# Patient Record
Sex: FEMALE | Race: WHITE | Employment: OTHER | URBAN - METROPOLITAN AREA
[De-identification: names, ages, dates, MRNs, and addresses within clinical notes are randomized per-mention and may not be internally consistent; named-entity substitution may affect disease eponyms.]

---

## 2017-02-10 ENCOUNTER — HOSPITAL ENCOUNTER (OUTPATIENT)
Dept: LAB | Age: 57
Discharge: HOME | End: 2017-02-10
Payer: SELF-PAY

## 2017-02-10 PROCEDURE — 88142 CYTOPATH C/V THIN LAYER: CPT | Performed by: OBSTETRICS & GYNECOLOGY

## 2017-07-07 ENCOUNTER — HOSPITAL ENCOUNTER (OUTPATIENT)
Dept: LAB | Age: 57
Discharge: HOME | End: 2017-07-07
Payer: SELF-PAY

## 2017-07-07 ENCOUNTER — HOSPITAL ENCOUNTER (OUTPATIENT)
Dept: LAB | Age: 57
Discharge: HOME | End: 2017-07-07

## 2017-07-07 PROCEDURE — 88305 TISSUE EXAM BY PATHOLOGIST: CPT | Performed by: OBSTETRICS & GYNECOLOGY

## 2017-07-07 PROCEDURE — 88142 CYTOPATH C/V THIN LAYER: CPT | Performed by: OBSTETRICS & GYNECOLOGY

## 2017-10-27 ENCOUNTER — HOSPITAL ENCOUNTER (OUTPATIENT)
Dept: LAB | Age: 57
Discharge: HOME | End: 2017-10-27
Payer: SELF-PAY

## 2017-10-27 PROCEDURE — 88142 CYTOPATH C/V THIN LAYER: CPT | Performed by: OBSTETRICS & GYNECOLOGY

## 2018-01-31 ENCOUNTER — HOSPITAL ENCOUNTER (OUTPATIENT)
Dept: LAB | Age: 58
Discharge: HOME | End: 2018-01-31
Payer: SELF-PAY

## 2018-01-31 PROCEDURE — 88142 CYTOPATH C/V THIN LAYER: CPT | Performed by: OBSTETRICS & GYNECOLOGY

## 2018-05-04 ENCOUNTER — HOSPITAL ENCOUNTER (OUTPATIENT)
Dept: LAB | Age: 58
Discharge: HOME | End: 2018-05-04
Payer: SELF-PAY

## 2018-05-04 PROCEDURE — 88142 CYTOPATH C/V THIN LAYER: CPT | Performed by: OBSTETRICS & GYNECOLOGY

## 2018-09-19 ENCOUNTER — HOSPITAL ENCOUNTER (OUTPATIENT)
Dept: LAB | Age: 58
Discharge: HOME | End: 2018-09-19
Payer: SELF-PAY

## 2018-09-19 PROBLEM — E66.01 SEVERE OBESITY (BMI 35.0-39.9): Status: ACTIVE | Noted: 2018-09-19

## 2018-09-19 PROCEDURE — 88142 CYTOPATH C/V THIN LAYER: CPT

## 2019-03-21 ENCOUNTER — HOSPITAL ENCOUNTER (OUTPATIENT)
Dept: MAMMOGRAPHY | Age: 59
Discharge: HOME | End: 2019-03-21
Attending: INTERNAL MEDICINE
Payer: COMMERCIAL

## 2019-03-21 DIAGNOSIS — Z12.39 ENCOUNTER FOR BREAST CANCER SCREENING USING NON-MAMMOGRAM MODALITY: ICD-10-CM

## 2019-03-21 PROCEDURE — 77067 SCR MAMMO BI INCL CAD: CPT

## 2019-03-22 ENCOUNTER — HOSPITAL ENCOUNTER (OUTPATIENT)
Dept: LAB | Age: 59
Discharge: HOME | End: 2019-03-22
Payer: COMMERCIAL

## 2019-03-22 PROCEDURE — 88142 CYTOPATH C/V THIN LAYER: CPT

## 2019-08-08 ENCOUNTER — HOSPITAL ENCOUNTER (OUTPATIENT)
Dept: PHYSICAL THERAPY | Age: 59
Discharge: HOME | End: 2019-08-08
Payer: COMMERCIAL

## 2019-08-08 PROCEDURE — 97110 THERAPEUTIC EXERCISES: CPT

## 2019-08-08 PROCEDURE — 97161 PT EVAL LOW COMPLEX 20 MIN: CPT

## 2019-08-08 NOTE — THERAPY EVALUATION
Brian Enriquez  : 1960  Primary: Myranda Spence Essentials*  Secondary:  [facility]rapy Center at [facility]  [address], [address] [place] [address], [address]  [phone]   [phone]        OUTPATIENT PHYSICAL THERAPY:Initial Assessment and PM 2019    ICD-10: Treatment Diagnosis: Muscle weakness (generalized) (M62.81) and Other lack of coordination (R27.8)  Precautions/Allergies:   Latex   MD Orders: Evaluate and treat MEDICAL/REFERRING DIAGNOSIS:  Separation of muscle (nontraumatic), other site [M62.08]   DATE OF ONSET: post hernia surgery  REFERRING PHYSICIAN: Marita Salgado DO  RETURN PHYSICIAN APPOINTMENT: 2019     INITIAL ASSESSMENT:  Brian Enriquez is a 63y.o. year old female with diagnosis of rectus diastasis. Contributing factors include decreased pelvic floor strength, decreased awareness transverse abdominis musculature, decreased tissue extensibility in abdomen. Patient is unable localize PF musculature without cueing and unable to locate transversus abdominis muscle without cueing, exhibits impaired pressure control via breathing and presents with poor abdominal control during exercise. The patient has lack of efficient self-management techniques/HEP and knowledge deficits regarding condition. These are all likely contributors to current symptoms. Patient will likely benefit from a combination of skilled physical therapy services and regular self-management techniques to address these findings, allowing for optimal therapeutic benefit and return to prior level of function. Proposed plan of care and importance of patient compliance discussed in detail with patient. All questions answered to patient's satisfaction. PROBLEM LIST (Impacting functional limitations):  1. Decreased Strength  2. Decreased Transfer Abilities  3. Decreased Knowledge of Precautions  4.  Decreased [place] with Home Exercise Program INTERVENTIONS PLANNED:  1. Neuromuscular re-education  2. Biofeedback as needed  3. HEP  4. Bladder retraining  5. Bladder education  6. Electrical Stimulation  7. Home Exercise Program (HEP)  8. Manual Therapy  9. Neuromuscular Re-education/Strengthening  10. Therapeutic Activites  11. Therapeutic Exercise/Strengthening  12. Transfer Training   TREATMENT PLAN:  Effective Dates: 8/8/2019 TO 11/6/2019 (90 days). Frequency/Duration: 1 time a week for 90 Days  GOALS: (Goals have been discussed and agreed upon with patient.)  Discharge Goals: Time  Frame: 90 days  1. Patient will verbalize the effects of bladder irritants and avoid as able to reduce abnormal bladder urgency. 2. Patient will use the pelvic brace exercise with all increases in intra-abdominal pressure to reduce or prevent stress urinary incontinence episodes. 3. Patient will demonstrate improved pelvic floor muscle coordination for bladder control by ability to perform at least 10 quick contractions in 10 seconds. 4. Patient will utilize proper toileting position and breathing coordination to eliminate straining with bowel movements. 5. Patient will decrease rectus diastasis to 2 finger width to increase ability to participate in pool aquatics with no discomfort. Outcome Measure:   [place]addi Female Pelvic Floor Questionnaire   Bladder Section: 4  Bowel Section: 15  Prolapse Section: 0   Sexual Function Section: 1  Total: 20  Medical Necessity:   · Patient demonstrates excellent rehab potential due to higher previous functional level. Reason for Services/Other Comments:  · Patient continues to require modification of therapeutic interventions to increase complexity of exercises. Total Duration: Evaluation 22 minutes, treatment 23 minutes       Rehabilitation Potential For Stated Goals: Excellent  Regarding [address] therapy, I certify that the treatment plan above will be carried out by a therapist or under their direction.   Thank you for this referral,  Laura Zacarias, PT, DPT     Referring Physician Signature: Valdez Taylor, DO              Date                    PAIN/SUBJECTIVE:   Initial:   0/10 Post Session:  0/10     HISTORY:   History of Present Injury/Illness (Reason for Referral): Has graham tummy tuck x3 c-sections. Breast reduction. Gallbladder, ovarian cyst. Very active daily pool aerobics. Hernia surgery. hemorhoidectomy    Urinary: Frequency 5 x/day, 1-2 x/night. Positive for urgency. Pt does not use not use pads for protection; 0 pads per day (PPD). Denies stress urinary incontinence (SARA), frequency, incomplete emptying, urinary hesitancy, dysuria and hematuria. Fluid intake: 64+ oz water/day; bladder irritants include: none  Bowel: Frequency 3x/day. Positive for pushing/straining with BM Negative for pincomplete emptying and fecal incontinence. [place] stool type: soft but small. Use of stool softeners or laxatives? Magnesium every night  Sexual: Pt is sexually active. Male partners. Contraception/birth control: no. Negative for dyspareunia. Pelvic Organ Prolapse/Pelvic Pain: Location: n/a. Worse with . Relieved with n/a. Max pain 0/10. Min pain 0/10. Past Medical History/Comorbidities:   Ms. aKrol Henderson  has a past medical history of Allergic rhinitis, cause unspecified, Arthritis, Cancer ([address] Chronic pain, Depressive disorder, not elsewhere classified, Endometrial cancer ([address] Impaired fasting glucose, Morbid obesity ([address] Nausea & vomiting, Other malaise and fatigue, Postsurgical hypothyroidism, Tension headache, Throat pain, and Type II or unspecified type diabetes mellitus without mention of complication, not stated as uncontrolled.  She also has no past medical history of Aneurysm ([address] Arrhythmia, Asthma, Autoimmune disease ([address] CAD (coronary artery disease), Chronic kidney disease, Chronic obstructive pulmonary disease ([address] Coagulation disorder ([address] Difficult intubation, Endocarditis, Heart failure ([address] Hypertension, Liver disease, Malignant hyperthermia due to anesthesia, Pseudocholinesterase deficiency, PUD (peptic ulcer disease), Rheumatic fever, Seizures ([address] Sleep apnea, Stroke ([facility] or Thromboembolus ([facility] Ms. Amol Reyes  has a past surgical history that includes hx breast reduction; hx partial thyroidectomy; hx cholecystectomy; hx hernia repair; hx  section; hx gyn; hx dilation and curettage (2016); and hx gyn (10/2016). Social History/Living Environment:     lives with   Have you ever had any pelvic trauma (orthopedic in nature, fall, MVA, etc.)? no  Have you ever experienced any unwanted physical or sexual contact? no  Have you ever experienced any form of medical trauma (GYN, urological, GI, etc)? no    Prior Level of Function/Work/Activity:  Live with , not working, independent. Had a fall     Ambulatory/Rehab Services H2 Model Falls Risk Assessment    Risk Factors:       No Risk Factors Identified Ability to Rise from Chair:       (0)  Ability to rise in a single movement    Falls Prevention Plan:       No modifications necessary   Total: (5 or greater = High Risk): 0     [facility] [employer]. All Rights Reserved. [facility] Patent #[id number]. Federal Law prohibits the replication, distribution or use without written permission from [facility] [employer]     Current Medications:       Current Outpatient Medications:     betamethasone valerate (VALISONE) 0.1 % topical cream, Apply  to affected area two (2) times a day., Disp: 45 g, Rfl: 3    montelukast (SINGULAIR) 10 mg tablet, Take 1 Tab by mouth nightly. Indications: inflammation of the nose due to an allergy, Disp: 90 Tab, Rfl: 3    nystatin (MYCOSTATIN) topical cream, Apply  to affected area two (2) times a day. PUT RX ON FILE, Disp: 30 g, Rfl: 5    levothyroxine (SYNTHROID) 175 mcg tablet, Take 1 Tab by mouth Daily (before breakfast). , Disp: 90 Tab, Rfl: 3    glucosamine/methylsulfonylmeth (GLUCOSAMINE MSM PO), Take  by mouth., Disp: , Rfl:     turmeric/turmeric ext/pepr ext (TURMERIC-TURMERIC EXT-PEPPER PO), Take  by mouth., Disp: , Rfl:     metFORMIN (GLUCOPHAGE) 500 mg tablet, Take 1 Tab by mouth two (2) times daily (with meals). , Disp: 180 Tab, Rfl: 3    sertraline (ZOLOFT) 50 mg tablet, Take 1 Tab by mouth nightly. One po q day, Disp: 90 Tab, Rfl: 3    cetirizine (ZYRTEC) 10 mg tablet, Take 10 mg by mouth daily. Take / use AM day of surgery  per anesthesia protocols. Indications: ALLERGIC RHINITIS, Disp: , Rfl:     CALCIUM CARBONATE (CALCIUM 500 PO), Take 500 mg by mouth nightly. Takes 1000 mg every night, Disp: , Rfl:     CHOLECALCIFEROL, VITAMIN D3, (VITAMIN D3 PO), Take  by mouth nightly., Disp: , Rfl:     MAGNESIUM OXIDE/MAG AA CHELATE (MAGNESIUM, OXIDE/AA CHELATE, PO), Take  by mouth nightly., Disp: , Rfl:    Date Last Reviewed:  2019   Number of Personal Factors/Comorbidities that affect the Plan of Care: 1-2: MODERATE COMPLEXITY   EXAMINATION:   Strength Testing:    R knee extension: 4/5  L knee extension: 4/5  R hip extension: 4/5  L hip extension: 4/5  R dorsiflexion: 4/5  L dorsiflexion: 4/5    Diastisis Recti Trainin finger widths      PALPATION:  PFM contraction: Able to perform PFM contraction, required cues to decrease hip adductor and gluteus overflow. Abdominal MM: decreased extensibility and sensation inferior abdomen along  scar. RANGE OF MOTION:  WFL    STRENGTH: Plan to test further next session with use of biofeedback. P: Power, E: Endurance, R: Repetitions, QF: Quick Flicks, TrA: Transverse Abdominus  P    E    R    QF    TrA Required max verbal, manual, visual cues     COORDINATION:  Diaphragmatic breathing (DB): required max verbal, manual, visual cues     Body Structures Involved:  1. Muscles Body Functions Affected:  1. Genitourinary  2. Neuromusculoskeletal Activities and Participation Affected:  1.  Interpersonal Interactions and Relationships  2.  Community, Social and [place]   Number of elements (examined above) that affect the Plan of Care: 3: MODERATE COMPLEXITY   CLINICAL PRESENTATION:   Presentation: Stable and uncomplicated: LOW COMPLEXITY   CLINICAL DECISION MAKING:   Use of outcome tool(s) and clinical judgement create a POC that gives a: Clear prediction of patient's progress: LOW COMPLEXITY        Deneen Hardwick PT, DPT

## 2019-08-08 NOTE — PROGRESS NOTES
Chanda Oh  : 1960  Primary: Asael Lei Essentials*  Secondary:  [facility] [address] [facility], [place] [address] [address] [facility]  [phone]   [phone]        OUTPATIENT PHYSICAL THERAPY: Daily Treatment Note 2019  Visit Count:  1    In addition to the evaluation, the following treatment was rendered. MEDICAL/REFERRING DIAGNOSIS:  Separation of muscle (nontraumatic), other site [M62.08]   REFERRING PHYSICIAN: Octavio Chavez DO  Pre-treatment Symptoms/Complaints:  none  Pain: Initial:   0/10 Post Session:  0/10   Medications Last Reviewed:  19  Updated Objective Findings:  See evaluation note from today   TREATMENT:   THERAPEUTIC EXERCISE: (23 minutes minutes):  Exercises per grid below to improve mobility, strength, balance and coordination. Required moderate visual, verbal, manual and tactile cues to promote proper body alignment, promote proper body mechanics and promote proper body breathing techniques. Progressed complexity of movement as indicated. Date:  19 Date:   Date:     Activity/Exercise Parameters Parameters Parameters   Diaphragmatic breathing 10' throughout all exercise, required max tactile cues to breath into restricted areas     PFM contraction supine 3 x 10     TrA contraction supine hooklying 2 x 10     PFM contraction sitting x10     hooklying marching x1 (unable to perform without tenting despite PFM and TrA cues)     SLR x1 (unable to perform without tenting despite PFM and TrA cues)               (Used abbreviations: MET - muscle energy technique; SCS- Strain counter strain; CTM- Connective tissue mobilizations; CR- Contract/relax; SP- Sustained pressure, TrP- Trigger point release, IASTM- Instrument assisted soft tissue mobilizations, TDN- Trigger point dry needling).      [employer] Portal  The following educational topics were reviewed with patient:  Diastasis recti separation, scar tissue mobilization, bowel mechanics and posture for BM to decrease straining. Treatment/Session Summary:    · Response to Treatment:  Patient exhibited improved ability to perform TrA and PFM contraction with cues. · Communication/Consultation:  None today  · Equipment provided today:  None today  · Recommendations/Intent for next treatment session: Next visit will focus on biofeedback to TrA and PFM to improve coordination and strength as well as postural retraining to improve diastasis rectus separation.     Total Treatment Billable Duration:  23 minutes     Elizabeth Dailey, PT, DPT    Future Appointments   Date Time Provider Joss Calderon   8/8/2019  2:00 PM Rhea Newton PT, DPT [facility]   9/27/2019  1:30 PM Brenton Toussaint MD [facility]   12/16/2019  8:30 AM TRIM LAB RESOURCE TRIM TRIM   12/30/2019  1:45 PM Becca Matos DO TRIM TRIM

## 2019-08-20 ENCOUNTER — HOSPITAL ENCOUNTER (OUTPATIENT)
Dept: PHYSICAL THERAPY | Age: 59
Discharge: HOME | End: 2019-08-20
Payer: COMMERCIAL

## 2019-08-20 PROCEDURE — 97530 THERAPEUTIC ACTIVITIES: CPT

## 2019-08-20 PROCEDURE — 97140 MANUAL THERAPY 1/> REGIONS: CPT

## 2019-08-20 PROCEDURE — 97110 THERAPEUTIC EXERCISES: CPT

## 2019-08-20 NOTE — PROGRESS NOTES
Eleazar Ceballos  : 1960  Primary: Jorge Amestcher Essentials*  Secondary:  [facility] at [facility] [address] [facility], [place], [facility] [address] [facility]  [phone]   [phone]        OUTPATIENT PHYSICAL THERAPY: Daily Treatment Note 2019  Visit Count:  2    Patient verbalizes visual improvement in diastasis rectus this session. MEDICAL/REFERRING DIAGNOSIS:  Separation of muscle (nontraumatic), other site [M62.08]   REFERRING PHYSICIAN: Sarmad MANN DO  Pre-treatment Symptoms/Complaints:  none  Pain: Initial: Pain Intensity 1: 0 0/10 Post Session:  0/10   Medications Last Reviewed:  19  Updated Objective Findings:  None Today   TREATMENT:   THERAPEUTIC ACTIVITY: ( 15 minutes): Instruction on coordinated pelvic floor and diaphragmatic breathing to improve kinesthetic awareness of pelvic muscle mobility and restore proper motor recruitment patterns with breathing, posture, and functional movement (e.g. Decreased IAP when performing activities). Extensive instruction on coordinated exhalation/expanded abdomen and external anal sphincter relaxation using appropriate toilet positioning (i.e. Squatty Potty toileting stool) for increased anorectal angle to reduce need for straining and/or digital evacuation. THERAPEUTIC EXERCISE: (15 minutes):  Exercises per grid below to improve mobility, strength, balance and coordination. Required moderate visual, verbal, manual and tactile cues to promote proper body alignment, promote proper body posture, promote proper body mechanics and promote proper body breathing techniques. Progressed complexity of movement as indicated. Biofeedback utilizes to improve coordination in PFM and abdominal muscles.       Date:  19 Date:  19 Date:     Activity/Exercise Parameters Parameters Parameters   Diaphragmatic breathing 10' throughout all exercise, required max tactile cues to breath into restricted areas Throughout all exercises, required max verbal cues    PFM contraction supine 3 x 10 5 x 10    TrA contraction supine hooklying 2 x 10 3 x 10    PFM contraction sitting x10     hooklying marching x1 (unable to perform without tenting despite PFM and TrA cues) x10 B with PFM and TrA contraction    SLR x1 (unable to perform without tenting despite PFM and TrA cues)     hooklying heel slide  x10 B with PFM and TrA contraction      MANUAL THERAPY: (23 minutes): Soft tissue mobilization and scar tissue mobilization was utilized and necessary because of the patient's restricted motion of soft tissue. (Used abbreviations: MET - muscle energy technique; SCS- Strain counter strain; CTM- Connective tissue mobilizations; CR- Contract/relax; SP- Sustained pressure, TrP- Trigger point release, IASTM- Instrument assisted soft tissue mobilizations, TDN- Trigger point dry needling). [email or web address] Portal  The following educational topics were reviewed with patient:  Diastasis recti separation, scar tissue mobilization, bowel mechanics and posture for BM to decrease straining. Treatment/Session Summary:    · Response to Treatment:  Patient exhibited improved ability to perform TrA and PFM contraction with cues. · Communication/Consultation:  None today  · Equipment provided today:  None today  · Recommendations/Intent for next treatment session: Next visit will focus on biofeedback to TrA and PFM to improve coordination and strength as well as postural retraining to improve diastasis rectus separation.     Total Treatment Billable Duration:  53 minutes  PT Patient Time In/Time Out  Time In: 8734  Time Out: 1640  Sam Cuevas PT, DPT    Future Appointments   Date Time Provider Joss Calderon   9/3/2019  3:15 PM Kit Meadows PT, DPT [facility]   9/27/2019  1:30 PM Aiyana Parker MD [facility]   12/16/2019  8:30 AM TRIM LAB RESOURCE TRIM TRIM   12/30/2019  1:45 PM DO BLAYNE Reyes TRIM

## 2019-09-03 ENCOUNTER — HOSPITAL ENCOUNTER (OUTPATIENT)
Dept: PHYSICAL THERAPY | Age: 59
Discharge: HOME | End: 2019-09-03
Payer: COMMERCIAL

## 2019-09-03 PROCEDURE — 97110 THERAPEUTIC EXERCISES: CPT

## 2019-09-03 NOTE — PROGRESS NOTES
Denisse Khan  : 1960  Primary: Koko Johnson Essentials*  Secondary:  [facility]rapy Center at [facility]  [address], [address] [facility], [place] [address] [address] [facility]  [phone]   [phone]        OUTPATIENT PHYSICAL THERAPY: Daily Treatment Note 9/3/2019  Visit Count:  3    Patient verbalizes visual improvement in diastasis rectus this session. MEDICAL/REFERRING DIAGNOSIS:  Separation of muscle (nontraumatic), other site [M62.08]   REFERRING PHYSICIAN: Maria Antonia MANN DO  Pre-treatment Symptoms/Complaints:  none  Pain: Initial: Pain Intensity 1: 0 0/10 Post Session:  0/10   Medications Last Reviewed:  19  Updated Objective Findings:  None Today   TREATMENT:   THERAPEUTIC ACTIVITY: (  minutes): Instruction on coordinated pelvic floor and diaphragmatic breathing to improve kinesthetic awareness of pelvic muscle mobility and restore proper motor recruitment patterns with breathing, posture, and functional movement (e.g. Decreased IAP when performing activities). Extensive instruction on coordinated exhalation/expanded abdomen and external anal sphincter relaxation using appropriate toilet positioning (i.e. Squatty Potty toileting stool) for increased anorectal angle to reduce need for straining and/or digital evacuation. THERAPEUTIC EXERCISE: (55 minutes):  Exercises per grid below to improve mobility, strength, balance and coordination. Required moderate visual, verbal, manual and tactile cues to promote proper body alignment, promote proper body posture, promote proper body mechanics and promote proper body breathing techniques. Progressed complexity of movement as indicated. Biofeedback utilizes to improve coordination in PFM and abdominal muscles.       Date:  19 Date:  19 Date:  9/3/19   Activity/Exercise Parameters Parameters Parameters   Diaphragmatic breathing 10' throughout all exercise, required max tactile cues to breath into restricted areas Throughout all exercises, required max verbal cues Throughout all exercises, required moderate verbal cues to coordinate exhale with PFM and TrA contraction   PFM contraction supine 3 x 10 5 x 10 3 x 10   TrA contraction supine hooklying 2 x 10 3 x 10 3 x 10   PFM contraction sitting x10  3 x 10   hooklying marching x1 (unable to perform without tenting despite PFM and TrA cues) x10 B with PFM and TrA contraction x10 B with PFM and TrA contraction   SLR x1 (unable to perform without tenting despite PFM and TrA cues)     hooklying heel slide  x10 B with PFM and TrA contraction x10 B with PFM and TrA contraction   Table top with PFM and TrA contraction   x10   Bridging PFM and TrA contraction   x15 no band  x10 with green band   hooklying hip abduction    x15 with PFM and TrA contraction with green band                       MANUAL THERAPY: ( minutes): Soft tissue mobilization and scar tissue mobilization was utilized and necessary because of the patient's restricted motion of soft tissue. (Used abbreviations: MET - muscle energy technique; SCS- Strain counter strain; CTM- Connective tissue mobilizations; CR- Contract/relax; SP- Sustained pressure, TrP- Trigger point release, IASTM- Instrument assisted soft tissue mobilizations, TDN- Trigger point dry needling). [employer]  The following educational topics were reviewed with patient:  Diastasis recti separation, scar tissue mobilization, bowel mechanics and posture for BM to decrease straining. Treatment/Session Summary:    · Response to Treatment:  Patient exhibited improved ability to perform TrA and PFM contraction with cues. · Communication/Consultation:  None today  · Equipment provided today:  None today  · Recommendations/Intent for next treatment session: Next visit will focus on biofeedback to TrA and PFM to improve coordination and strength as well as postural retraining to improve diastasis rectus separation.     Total Treatment Billable Duration:  53 minutes  PT Patient Time In/Time Out  Time In: 1505  Time Out: [address], PT, DPT    Future Appointments   Date Time Provider Joss Calderon   9/17/2019  3:15 PM Lamar Barnes PT, DPT [facility]   9/27/2019  1:30 PM Loli Ladd MD [facility]   12/16/2019  8:30 AM TRIM LAB RESOURCE TRIM TRIM   12/30/2019  1:45 PM Dilia Houston DO TRIM TRIM

## 2019-09-17 ENCOUNTER — HOSPITAL ENCOUNTER (OUTPATIENT)
Dept: PHYSICAL THERAPY | Age: 59
Discharge: HOME | End: 2019-09-17
Payer: COMMERCIAL

## 2019-09-17 PROCEDURE — 97110 THERAPEUTIC EXERCISES: CPT

## 2019-09-17 NOTE — PROGRESS NOTES
Mariah Matos  : 1960  Primary: [employer]*  Secondary:  [facility] [facility], [place] [address] [address] [facility]  [phone]   [phone]        OUTPATIENT PHYSICAL THERAPY: Daily Treatment Note 2019  Visit Count:  4    Patient verbalizes visual improvement in diastasis rectus this session. MEDICAL/REFERRING DIAGNOSIS:  Separation of muscle (nontraumatic), other site [M62.08]   REFERRING PHYSICIAN: Nacho MANN DO  Pre-treatment Symptoms/Complaints:  none  Pain: Initial: Pain Intensity 1: 0 0/10 Post Session:  0/10   Medications Last Reviewed:  19  Updated Objective Findings:  None Today   TREATMENT:   THERAPEUTIC ACTIVITY: (  minutes): Instruction on coordinated pelvic floor and diaphragmatic breathing to improve kinesthetic awareness of pelvic muscle mobility and restore proper motor recruitment patterns with breathing, posture, and functional movement (e.g. Decreased IAP when performing activities). Extensive instruction on coordinated exhalation/expanded abdomen and external anal sphincter relaxation using appropriate toilet positioning (i.e. Squatty Potty toileting stool) for increased anorectal angle to reduce need for straining and/or digital evacuation. THERAPEUTIC EXERCISE: (55 minutes):  Exercises per grid below to improve mobility, strength, balance and coordination. Required moderate visual, verbal, manual and tactile cues to promote proper body alignment, promote proper body posture, promote proper body mechanics and promote proper body breathing techniques. Progressed complexity of movement as indicated. Biofeedback utilizes to improve coordination in PFM and abdominal muscles.       Date:  19 Date:  19 Date:  9/3/19 Date:  19    Activity/Exercise Parameters Parameters Parameters     Diaphragmatic breathing 10' throughout all exercise, required max tactile cues to breath into restricted areas Throughout all exercises, required max verbal cues Throughout all exercises, required moderate verbal cues to coordinate exhale with PFM and TrA contraction Throughout all exercises, required moderate verbal cues to coordinate exhale with PFM and TrA contraction    PFM contraction supine 3 x 10 5 x 10 3 x 10     TrA contraction supine hooklying 2 x 10 3 x 10 3 x 10     PFM contraction sitting x10  3 x 10     hooklying marching x1 (unable to perform without tenting despite PFM and TrA cues) x10 B with PFM and TrA contraction x10 B with PFM and TrA contraction     SLR x1 (unable to perform without tenting despite PFM and TrA cues)   x10 B with PRM and trA contraction    hooklying heel slide  x10 B with PFM and TrA contraction x10 B with PFM and TrA contraction x10 B with PRM and trA contraction    Table top with PFM and TrA contraction   x10     Bridging PFM and TrA contraction   x15 no band  x10 with green band     hooklying hip abduction    x15 with PFM and TrA contraction with green band     Dead bug    x10 B with PRM and trA contraction    Sit to stand    x10 B with PRM and trA contraction    Marching on hard surface    x10 B with PRM and trA contraction    Marching on stability ball    x10 B with PRM and trA contraction                              MANUAL THERAPY: ( minutes): Soft tissue mobilization and scar tissue mobilization was utilized and necessary because of the patient's restricted motion of soft tissue. (Used abbreviations: MET - muscle energy technique; SCS- Strain counter strain; CTM- Connective tissue mobilizations; CR- Contract/relax; SP- Sustained pressure, TrP- Trigger point release, IASTM- Instrument assisted soft tissue mobilizations, TDN- Trigger point dry needling).      [employer] [facility]  The following educational topics were reviewed with patient:  Diastasis recti separation, scar tissue mobilization, bowel mechanics and posture for BM to decrease straining. Treatment/Session Summary:    · Response to Treatment:  Patient exhibited improved ability to perform TrA and PFM contraction with cues. · Communication/Consultation:  None today  · Equipment provided today:  None today  · Recommendations/Intent for next treatment session: Next visit will focus on biofeedback to TrA and PFM to improve coordination and strength as well as postural retraining to improve diastasis rectus separation.     Total Treatment Billable Duration:  53 minutes  PT Patient Time In/Time Out  Time In: 7802  Time Out: 4456  Elis Roth PT, DPT    Future Appointments   Date Time Provider Joss Calderon   9/27/2019  1:30 PM Matthew Alexandre MD [facility]   12/16/2019  8:30 AM TRIM LAB RESOURCE TRIM TRIM   12/30/2019  1:45 PM DO BLAYNE Pearl TRIM

## 2019-09-27 ENCOUNTER — HOSPITAL ENCOUNTER (OUTPATIENT)
Dept: LAB | Age: 59
Discharge: HOME | End: 2019-09-27
Payer: COMMERCIAL

## 2019-09-27 PROCEDURE — 88142 CYTOPATH C/V THIN LAYER: CPT

## 2019-11-01 NOTE — THERAPY EVALUATION
Mono Magallanes : 1960 [place]: [place] [employer]* Secondary:  [facility] [address] [facility], [place] [address] [address] [facility] [phone]   [phone] OUTPATIENT PHYSICAL THERAPY:Progress Report 2019 ICD-10: Treatment Diagnosis: Muscle weakness (generalized) (M62.81) and Other lack of coordination (R27.8) Precautions/Allergies:  
Latex MD Orders: Evaluate and treat MEDICAL/REFERRING DIAGNOSIS: 
Separation of muscle (nontraumatic), other site [M62.08] DATE OF ONSET: post hernia surgery REFERRING PHYSICIAN: Jero Colon DO 
RETURN PHYSICIAN APPOINTMENT: 2019 Progress Report:  Mono Magallanes is a 63y.o. year old female with diagnosis of rectus diastasis. Contributing factors include decreased pelvic floor strength, decreased awareness transverse abdominis musculature, decreased tissue extensibility in abdomen. She has experienced visual improvement in diastasis rectus separation, improvement in urinary symptoms. She presents with improved intraabdominal and breathing control while performing all exercises, however continues to require cues for correct performance. Recommend continued physical therapy one time per week to progress therapeutic exercises, improve mechanics for lifting. Patient will likely benefit from a combination of skilled physical therapy services and regular self-management techniques to address these findings, allowing for optimal therapeutic benefit and return to prior level of function. Proposed plan of care and importance of patient compliance discussed in detail with patient. All questions answered to patient's satisfaction. PROBLEM LIST (Impacting functional limitations): 1. Decreased Strength 2. Decreased Transfer Abilities 3. Decreased Knowledge of Precautions 4.  Decreased [place] with Home Exercise Program INTERVENTIONS PLANNED: 
 1. Neuromuscular re-education 2. Biofeedback as needed 3. HEP 4. Bladder retraining 5. Bladder education 6. Electrical Stimulation 7. Home Exercise Program (HEP) 8. Manual Therapy 9. Neuromuscular Re-education/Strengthening 10. Therapeutic Activites 11. Therapeutic Exercise/Strengthening 12. Transfer Training TREATMENT PLAN: 
Effective Dates: 8/8/2019 TO 11/6/2019 (90 days). Frequency/Duration: 1 time a week for 90 Days GOALS: (Goals have been discussed and agreed upon with patient.) Discharge Goals: Time  Frame: 90 days 1. Patient will verbalize the effects of bladder irritants and avoid as able to reduce abnormal bladder urgency. 2. Patient will use the pelvic brace exercise with all increases in intra-abdominal pressure to reduce or prevent stress urinary incontinence episodes. 3. Patient will demonstrate improved pelvic floor muscle coordination for bladder control by ability to perform at least 10 quick contractions in 10 seconds. 4. Patient will utilize proper toileting position and breathing coordination to eliminate straining with bowel movements. 5. Patient will decrease rectus diastasis to 2 finger width to increase ability to participate in pool aquatics with no discomfort. Outcome Measure:  
Maddi Female Pelvic Floor Questionnaire Bladder Section: 4 Bowel Section: 15 Prolapse Section: 0 Sexual Function Section: 1 Total: 20 Medical Necessity:  
· Patient demonstrates excellent rehab potential due to higher previous functional level. Reason for Services/Other Comments: 
· Patient continues to require modification of therapeutic interventions to increase complexity of exercises. Total Duration: Evaluation 22 minutes, treatment 23 minutes PT Patient Time In/Time Out Time In: 4850 Time Out: 2396 Rehabilitation Potential For Stated Goals: Excellent Regarding Samantha Ibarra's therapy, I certify that the treatment plan above will be carried out by a therapist or under their direction. Thank you for this referral, Elizabeth Dailey, PT, DPT Referring Physician Signature: Becca Matos, DO              Date PAIN/SUBJECTIVE:  
Initial: Pain Intensity 1: 0 0/10 Post Session:  0/10 HISTORY:  
History of Present Injury/Illness (Reason for Referral): Has [facility] tummy tuck x3 c-sections. Breast reduction. Gallbladder, ovarian cyst. Very active daily pool aerobics. Hernia surgery. hemorhoidectomy Urinary: Frequency 5 x/day, 1-2 x/night. Positive for urgency. Pt does not use not use pads for protection; 0 pads per day (PPD). Denies stress urinary incontinence (SARA), frequency, incomplete emptying, urinary hesitancy, dysuria and hematuria. Fluid intake: 64+ oz water/day; bladder irritants include: none Bowel: Frequency 3x/day. Positive for pushing/straining with BM Negative for pincomplete emptying and fecal incontinence. [place] stool type: soft but small. Use of stool softeners or laxatives? Magnesium every night Sexual: Pt is sexually active. Male partners. Contraception/birth control: no. Negative for dyspareunia. Pelvic Organ Prolapse/Pelvic Pain: Location: n/a. Worse with . Relieved with n/a. Max pain 0/10. Min pain 0/10. Past Medical History/Comorbidities: Ms. Nikia Aj  has a past medical history of Allergic rhinitis, cause unspecified, Arthritis, Cancer ([address] Chronic pain, Depressive disorder, not elsewhere classified, Endometrial cancer ([address] Impaired fasting glucose, Morbid obesity ([address] Nausea & vomiting, Other malaise and fatigue, Postsurgical hypothyroidism, Tension headache, Throat pain, and Type II or unspecified type diabetes mellitus without mention of complication, not stated as uncontrolled.  She also has no past medical history of Aneurysm ([address] Arrhythmia, Asthma, Autoimmune disease ([address] CAD (coronary artery disease), Chronic kidney disease, Chronic obstructive pulmonary disease ([address] Coagulation disorder ([address] Difficult intubation, Endocarditis, Heart failure ([address] Hypertension, Liver disease, Malignant hyperthermia due to anesthesia, Pseudocholinesterase deficiency, PUD (peptic ulcer disease), Rheumatic fever, Seizures ([address] Sleep apnea, Stroke ([address] or Thromboembolus ([address] Ms. Bora Valdes  has a past surgical history that includes hx breast reduction; hx partial thyroidectomy; hx cholecystectomy; hx hernia repair; hx  section; hx gyn; hx dilation and curettage (2016); and hx gyn (10/2016). Social History/Living Environment:  
  lives with  Have you ever had any pelvic trauma (orthopedic in nature, fall, MVA, etc.)? no 
Have you ever experienced any unwanted physical or sexual contact? no 
Have you ever experienced any form of medical trauma (GYN, urological, GI, etc)? no 
 
Prior Level of Function/Work/Activity: 
Live with , not working, independent. Had a fall Ambulatory/Rehab Services H2 Model Falls Risk Assessment Risk Factors: 
     No Risk Factors Identified Ability to Rise from Chair: 
     (0)  Ability to rise in a single movement Falls Prevention Plan: No modifications necessary Total: (5 or greater = High Risk): 0  
  [facility] [address] [place] [address] Patent #[id number]. Federal Law prohibits the replication, distribution or use without written permission from [facility] [employer] Current Medications:   
  
Current Outpatient Medications:  
  betamethasone valerate (VALISONE) 0.1 % topical cream, Apply  to affected area two (2) times a day., Disp: 45 g, Rfl: 3 
  montelukast (SINGULAIR) 10 mg tablet, Take 1 Tab by mouth nightly. Indications: inflammation of the nose due to an allergy, Disp: 90 Tab, Rfl: 3 
  nystatin (MYCOSTATIN) topical cream, Apply  to affected area two (2) times a day. PUT RX ON FILE, Disp: 30 g, Rfl: 5   levothyroxine (SYNTHROID) 175 mcg tablet, Take 1 Tab by mouth Daily (before breakfast). , Disp: 90 Tab, Rfl: 3 
  glucosamine/methylsulfonylmeth (GLUCOSAMINE MSM PO), Take  by mouth., Disp: , Rfl:  
  turmeric/turmeric ext/pepr ext (TURMERIC-TURMERIC EXT-PEPPER PO), Take  by mouth., Disp: , Rfl:  
  metFORMIN (GLUCOPHAGE) 500 mg tablet, Take 1 Tab by mouth two (2) times daily (with meals). , Disp: 180 Tab, Rfl: 3 
  sertraline (ZOLOFT) 50 mg tablet, Take 1 Tab by mouth nightly. One po q day, Disp: 90 Tab, Rfl: 3 
  cetirizine (ZYRTEC) 10 mg tablet, Take 10 mg by mouth daily. Take / use AM day of surgery  per anesthesia protocols. Indications: ALLERGIC RHINITIS, Disp: , Rfl:  
  CALCIUM CARBONATE (CALCIUM 500 PO), Take 500 mg by mouth nightly. Takes 1000 mg every night, Disp: , Rfl:  
  CHOLECALCIFEROL, VITAMIN D3, (VITAMIN D3 PO), Take  by mouth nightly., Disp: , Rfl:  
  MAGNESIUM OXIDE/MAG AA CHELATE (MAGNESIUM, OXIDE/AA CHELATE, PO), Take  by mouth nightly., Disp: , Rfl:   
Date Last Reviewed:  2019 Number of Personal Factors/Comorbidities that affect the Plan of Care: 1-2: MODERATE COMPLEXITY EXAMINATION:  
Strength Testing: 
 
R knee extension: 4/5 L knee extension: 4/5 R hip extension: 4/5 L hip extension: 4/5 
R dorsiflexion: 4/5 L dorsiflexion: 4/5 Diastisis Recti Trainin finger widths PALPATION: 
PFM contraction: Able to perform PFM contraction, required cues to decrease hip adductor and gluteus overflow. Abdominal MM: decreased extensibility and sensation inferior abdomen along  scar. RANGE OF MOTION: 
WFL 
 
STRENGTH: Plan to test further next session with use of biofeedback. P: Power, E: Endurance, R: Repetitions, QF: Quick Flicks, TrA: Transverse Abdominus P   
E   
R   
QF TrA Required max verbal, manual, visual cues COORDINATION: 
Diaphragmatic breathing (DB): required max verbal, manual, visual cues Body Structures Involved: 1. Muscles Body Functions Affected: 1. Genitourinary 2. Neuromusculoskeletal Activities and Participation Affected: 
1. Interpersonal Interactions and Relationships 2. Community, Social and Left Hand [place] Number of elements (examined above) that affect the Plan of Care: 3: MODERATE COMPLEXITY CLINICAL PRESENTATION:  
Presentation: Stable and uncomplicated: LOW COMPLEXITY CLINICAL DECISION MAKING:  
Use of outcome tool(s) and clinical judgement create a POC that gives a: Clear prediction of patient's progress: LOW COMPLEXITY Purnima Ortega PT, DPT

## 2019-12-17 NOTE — THERAPY DISCHARGE
Eleazar Ceballos : 1960 [place] [employer]* Secondary:  [facility] [facility], [place] [address] [address] [facility] [phone]   [phone] OUTPATIENT PHYSICAL THERAPY:Discontinuation Summary 2019 ICD-10: Treatment Diagnosis: Muscle weakness (generalized) (M62.81) and Other lack of coordination (R27.8) Precautions/Allergies:  
Latex MD Orders: Evaluate and treat MEDICAL/REFERRING DIAGNOSIS: 
Separation of muscle (nontraumatic), other site [M62.08] DATE OF ONSET: post hernia surgery REFERRING PHYSICIAN: Monet Sosa DO 
RETURN PHYSICIAN APPOINTMENT: 2019 Eleazar Ceballos has been seen in physical therapy from 19 to 19 for 4 visits. Treatment has been discontinued at this time due to patient failing to return for additional treatment. The below goals were met prior to discontinuation. Some goals were not met due to patient not able to participate in therapy as expected. Thank you for this referral.    
  
PROBLEM LIST (Impacting functional limitations): 1. Decreased Strength 2. Decreased Transfer Abilities 3. Decreased Knowledge of Precautions 4. Decreased [place] with Home Exercise Program INTERVENTIONS PLANNED: 
1. Neuromuscular re-education 2. Biofeedback as needed 3. HEP 4. Bladder retraining 5. Bladder education 6. Electrical Stimulation 7. Home Exercise Program (HEP) 8. Manual Therapy 9. Neuromuscular Re-education/Strengthening 10. Therapeutic Activites 11. Therapeutic Exercise/Strengthening 12. Transfer Training TREATMENT PLAN: 
Effective Dates: 2019 TO 2019 (90 days). Frequency/Duration: 1 time a week for 90 Days GOALS: (Goals have been discussed and agreed upon with patient.) Not med secondary to therapy discontinuation. Discharge Goals: Time  Frame: 90 days 1.  Patient will verbalize the effects of bladder irritants and avoid as able to reduce abnormal bladder urgency. 2. Patient will use the pelvic brace exercise with all increases in intra-abdominal pressure to reduce or prevent stress urinary incontinence episodes. 3. Patient will demonstrate improved pelvic floor muscle coordination for bladder control by ability to perform at least 10 quick contractions in 10 seconds. 4. Patient will utilize proper toileting position and breathing coordination to eliminate straining with bowel movements. 5. Patient will decrease rectus diastasis to 2 finger width to increase ability to participate in pool aquatics with no discomfort. Outcome Measure:  
Maddi Female Pelvic Floor Questionnaire Bladder Section: 4 Bowel Section: 15 Prolapse Section: 0 Sexual Function Section: 1 Total: 20 Medical Necessity:  
· Patient demonstrates excellent rehab potential due to higher previous functional level. Reason for Services/Other Comments: 
· Patient continues to require modification of therapeutic interventions to increase complexity of exercises. Rehabilitation Potential For Stated Goals: Excellent Thank you for this referral, Neri Reyes, PT, DPT

## 2020-06-26 ENCOUNTER — HOSPITAL ENCOUNTER (OUTPATIENT)
Dept: LAB | Age: 60
Discharge: HOME | End: 2020-06-26
Payer: SELF-PAY

## 2020-06-26 PROCEDURE — 88142 CYTOPATH C/V THIN LAYER: CPT

## 2020-12-16 ENCOUNTER — HOSPITAL ENCOUNTER (OUTPATIENT)
Dept: LAB | Age: 60
Discharge: HOME | End: 2020-12-16

## 2020-12-16 PROCEDURE — 88142 CYTOPATH C/V THIN LAYER: CPT

## 2021-06-23 ENCOUNTER — HOSPITAL ENCOUNTER (OUTPATIENT)
Dept: LAB | Age: 61
Discharge: HOME | End: 2021-06-23

## 2021-06-23 DIAGNOSIS — C54.1 ENDOMETRIAL CANCER (HCC): ICD-10-CM

## 2021-06-23 PROCEDURE — 88142 CYTOPATH C/V THIN LAYER: CPT

## 2022-03-19 PROBLEM — E66.01 SEVERE OBESITY WITH BODY MASS INDEX (BMI) OF 35.0 TO 39.9 WITH SERIOUS COMORBIDITY (HCC): Status: ACTIVE | Noted: 2018-09-19

## 2022-06-01 ENCOUNTER — TELEPHONE (OUTPATIENT)
Dept: ONCOLOGY | Age: 62
End: 2022-06-01

## 2022-06-01 NOTE — TELEPHONE ENCOUNTER
Pt called requesting to speak to the provider. Stated she is concerned about some pain and cramps she is having in her lower stomach more to her left side. On Saturday of last week stated she had some bleeding when using the bathroom (bright red). Requested to speak to someone.

## 2022-06-02 ENCOUNTER — TELEMEDICINE (OUTPATIENT)
Dept: INTERNAL MEDICINE CLINIC | Facility: CLINIC | Age: 62
End: 2022-06-02

## 2022-06-02 DIAGNOSIS — R30.0 DYSURIA: Primary | ICD-10-CM

## 2022-06-02 DIAGNOSIS — R10.2 PELVIC PAIN: ICD-10-CM

## 2022-06-02 LAB
BACTERIA URINE, POC: ABNORMAL
BILIRUBIN, URINE, POC: NEGATIVE
BLOOD URINE, POC: ABNORMAL
CASTS URINE, POC: ABNORMAL
EPI CELLS URINE, POC: ABNORMAL
GLUCOSE URINE, POC: NEGATIVE
KETONES, URINE, POC: NEGATIVE
LEUKOCYTE ESTERASE, URINE, POC: ABNORMAL
NITRITE, URINE, POC: NEGATIVE
PH, URINE, POC: 6.5 (ref 4.6–8)
PROTEIN,URINE, POC: NEGATIVE
RBC, URINE, POC: ABNORMAL
SPECIFIC GRAVITY, URINE, POC: 1 (ref 1–1.03)
TRICHOMONAS URINE, POC: ABNORMAL
URINALYSIS CLARITY, POC: ABNORMAL
URINALYSIS COLOR, POC: YELLOW
UROBILINOGEN, POC: ABNORMAL
WBC, URINE, POC: ABNORMAL
YEAST, URINE, POC: ABNORMAL

## 2022-06-02 PROCEDURE — 99213 OFFICE O/P EST LOW 20 MIN: CPT | Performed by: INTERNAL MEDICINE

## 2022-06-02 PROCEDURE — 81000 URINALYSIS NONAUTO W/SCOPE: CPT | Performed by: INTERNAL MEDICINE

## 2022-06-02 NOTE — PROGRESS NOTES
Candice Saavedra was seen today for dysuria. Diagnoses and all orders for this visit:    Dysuria  Comments:  advise take doxycyline as she has a rx  Orders:  -     AMB POC URINALYSIS DIP STICK MANUAL W/ MICRO BSSC    Pelvic pain  Comments:  supect some sort of prolapse. cannot examine today due only able to do virtual visit. Says has appt with oncology soon. ask get pelvic there if possible          Colton Carroll is a 62 y.o. female    Chief Complaint   Patient presents with    Dysuria     pressure and cramping   2 weeks ago cramp ,had red blood groin area. Azo helped some  macrobid cleared it then after 5 days that cramping with bm and urinating  Painful lower abdomen.    miralax       Results for orders placed or performed in visit on 06/02/22   AMB POC URINALYSIS DIP STICK MANUAL W/ MICRO BSSC   Result Value Ref Range    Color (UA POC) Yellow     Clarity (UA POC) Cloudy     Glucose, Urine, POC Negative Negative    Bilirubin, Urine, POC Negative Negative    KETONES, Urine, POC Negative Negative    Specific Gravity, Urine, POC 1.005 1.001 - 1.035    Blood (UA POC) Trace Negative    pH, Urine, POC 6.5 4.6 - 8.0    Protein, Urine, POC Negative Negative    Urobilinogen, POC 0.2 mg/dL     Nitrite, Urine, POC Negative Negative    Leukocyte Esterase, Urine, POC Trace Negative    RBC, Urine, POC      WBC, Urine, POC      Epi Cells Urine, POC      Bacteria Urine, POC Trace Negative    casts urine, poc      Yeast, Urine, POC      Trichomonas Urine, POC         Past Medical History:   Diagnosis Date    Allergic rhinitis, cause unspecified     Arthritis     \"everywhere\"    Cancer ([facility]     thyroid- dx 1988    Chronic pain     knees    Depressive disorder, not elsewhere classified     Endometrial cancer ([facility] [address]     dx 10/3/16    Impaired fasting glucose     Morbid obesity (HCC)     BMI- 37 10/19/16    Nausea & vomiting     POV but \"The last surgery was the best\"- D&C 9/28/16    Other malaise and fatigue     Postsurgical hypothyroidism     Tension headache     Throat pain     Type II or unspecified type diabetes mellitus without mention of complication, not stated as uncontrolled     checks fastings almost daily-avg 108- today (10/19/16)= 90- 5 hrs pc; last A1C= 6.0 in Sept 2016        Family History   Problem Relation Age of Onset    No Known Problems Sister     No Known Problems Brother     No Known Problems Brother     No Known Problems Brother     No Known Problems Brother     No Known Problems Brother     No Known Problems Brother     No Known Problems Brother     Migraines Father     Heart Disease Father         MI    Cancer Brother         skin    Hypertension Mother     Stroke Mother         TIA    No Known Problems Sister     No Known Problems Sister         Social History     Socioeconomic History    Marital status:      Spouse name: Not on file    Number of children: Not on file    Years of education: Not on file    Highest education level: Not on file   Occupational History    Not on file   Tobacco Use    Smoking status: Never Smoker    Smokeless tobacco: Never Used   Substance and Sexual Activity    Alcohol use: No    Drug use: No    Sexual activity: Not on file   Other Topics Concern    Not on file   Social History Narrative    Not on file     Social Determinants of Health     Financial Resource Strain:     Difficulty of Paying Living Expenses: Not on file   Food Insecurity:     Worried About Running Out of Food in the Last Year: Not on file    Prasad of Food in the Last Year: Not on file   Transportation Needs:     Lack of Transportation (Medical): Not on file    Lack of Transportation (Non-Medical):  Not on file   Physical Activity:     Days of Exercise per Week: Not on file    Minutes of Exercise per Session: Not on file   Stress:     Feeling of Stress : Not on file   Social Connections:     Frequency of Communication with Friends and Family: Not on file    Frequency of Social Gatherings with Friends and Family: Not on file    Attends Sikh Services: Not on file    Active Member of Clubs or Organizations: Not on file    Attends Club or Organization Meetings: Not on file    Marital Status: Not on file   Intimate Partner Violence:     Fear of Current or Ex-Partner: Not on file    Emotionally Abused: Not on file    Physically Abused: Not on file    Sexually Abused: Not on file   Housing Stability:     Unable to Pay for Housing in the Last Year: Not on file    Number of [place] in the Last Year: Not on file    Unstable Housing in the Last Year: Not on file         Current Outpatient Medications:     albuterol sulfate  (90 Base) MCG/ACT inhaler, Inhale 1 puff into the lungs every 4 hours as needed, Disp: , Rfl:     albuterol (PROVENTIL) (2.5 MG/3ML) 0.083% nebulizer solution, Inhale 2.5 mg into the lungs every 6 hours as needed, Disp: , Rfl:     benzonatate (TESSALON) 100 MG capsule, 1-2 up to tid prn, Disp: , Rfl:     cetirizine (ZYRTEC) 10 MG tablet, Take 10 mg by mouth daily, Disp: , Rfl:     fluticasone (FLONASE) 50 MCG/ACT nasal spray, 2 sprays by Nasal route daily, Disp: , Rfl:     levothyroxine (SYNTHROID) 175 MCG tablet, Take 175 mcg by mouth every morning (before breakfast), Disp: , Rfl:     montelukast (SINGULAIR) 10 MG tablet, Take 10 mg by mouth, Disp: , Rfl:     nystatin (MYCOSTATIN) 207798 UNIT/GM cream, Apply topically 2 times daily, Disp: , Rfl:     sertraline (ZOLOFT) 50 MG tablet, Take 50 mg by mouth, Disp: , Rfl:     Allergies   Allergen Reactions    Latex Other (See Comments)     Rips skin off         Review of Systems        Vitals  - if done, taken by patient or caregiver at home. Physical Exam       Margie Burleson was seen today for dysuria.     Diagnoses and all orders for this visit:    Dysuria  Comments:  advise take doxycyline as she has a rx  Orders:  -     AMB POC URINALYSIS DIP STICK MANUAL W/ MICRO BSSC    Pelvic pain  Comments:  supect some sort of prolapse. cannot examine today due only able to do virtual visit. Says has appt with oncology soon. ask get pelvic there if possible          I was at home while conducting this encounter. Consent:  She and/or her healthcare decision maker is aware that this patient-initiated Telehealth encounter is a billable service, with coverage as determined by her insurance carrier. She is aware that she may receive a bill and has provided verbal consent to proceed: Yes    This virtual visit was conducted VV Type: Video and Audio    Total Time: minutes: 11-20 minutes. Annabelle Rees, was evaluated through a synchronous (real-time) audio-video encounter. The patient (or guardian if applicable) is aware that this is a billable service. Verbal consent to proceed has been obtained. The visit was conducted pursuant to the emergency declaration under the [facility], [address] [place] [address] [facility] authority and the [employer] Act. Patient identification was verified, and a caregiver was present when appropriate. The patient was located at home in a state where the provider was licensed to provide care.        oJsé Carolina DO

## 2022-06-02 NOTE — TELEPHONE ENCOUNTER
Discussed with Dr Asad Rodriguez. Patient was seen by PCP today and started treatment for possible UTI. She is to call us if she is still having abdominal pain once antibiotic completed and the UTI has resolved. If needed may order scan at that time. She has an appointment coming up later this month with Dr Asad Rodriguez and will keep that appointment.  She is in agreement with plan and knows to call with further concerns

## 2022-06-27 ENCOUNTER — HOSPITAL ENCOUNTER (EMERGENCY)
Age: 62
Discharge: HOME | End: 2022-06-27
Attending: EMERGENCY MEDICINE

## 2022-06-27 ENCOUNTER — TELEPHONE (OUTPATIENT)
Dept: INTERNAL MEDICINE CLINIC | Facility: CLINIC | Age: 62
End: 2022-06-27

## 2022-06-27 ENCOUNTER — HOSPITAL ENCOUNTER (EMERGENCY)
Dept: CT IMAGING | Age: 62
Discharge: HOME | End: 2022-06-30

## 2022-06-27 VITALS
HEIGHT: 67 IN | OXYGEN SATURATION: 98 % | HEART RATE: 79 BPM | SYSTOLIC BLOOD PRESSURE: 148 MMHG | RESPIRATION RATE: 17 BRPM | DIASTOLIC BLOOD PRESSURE: 75 MMHG | WEIGHT: 235 LBS | TEMPERATURE: 98.9 F | BODY MASS INDEX: 36.88 KG/M2

## 2022-06-27 DIAGNOSIS — K57.32 DIVERTICULITIS OF COLON: Primary | ICD-10-CM

## 2022-06-27 LAB
ALBUMIN SERPL-MCNC: 3.7 G/DL (ref 3.2–4.6)
ALBUMIN/GLOB SERPL: 1.1 {RATIO} (ref 1.2–3.5)
ALP SERPL-CCNC: 95 U/L (ref 50–130)
ALT SERPL-CCNC: 18 U/L (ref 12–65)
ANION GAP SERPL CALC-SCNC: 6 MMOL/L (ref 7–16)
APPEARANCE UR: CLEAR
AST SERPL-CCNC: 12 U/L (ref 15–37)
BACTERIA URNS QL MICRO: 0 /HPF
BASOPHILS # BLD: 0.1 K/UL (ref 0–0.2)
BASOPHILS NFR BLD: 1 % (ref 0–2)
BILIRUB SERPL-MCNC: 0.3 MG/DL (ref 0.2–1.1)
BILIRUB UR QL: NEGATIVE
BUN SERPL-MCNC: 11 MG/DL (ref 8–23)
CALCIUM SERPL-MCNC: 9.8 MG/DL (ref 8.3–10.4)
CASTS URNS QL MICRO: 0 /LPF
CHLORIDE SERPL-SCNC: 105 MMOL/L (ref 98–107)
CO2 SERPL-SCNC: 30 MMOL/L (ref 21–32)
COLOR UR: YELLOW
CREAT SERPL-MCNC: 0.73 MG/DL (ref 0.6–1)
DIFFERENTIAL METHOD BLD: ABNORMAL
EOSINOPHIL # BLD: 0.2 K/UL (ref 0–0.8)
EOSINOPHIL NFR BLD: 1 % (ref 0.5–7.8)
EPI CELLS #/AREA URNS HPF: 0 /HPF
ERYTHROCYTE [DISTWIDTH] IN BLOOD BY AUTOMATED COUNT: 14.1 % (ref 11.9–14.6)
GLOBULIN SER CALC-MCNC: 3.5 G/DL (ref 2.3–3.5)
GLUCOSE SERPL-MCNC: 117 MG/DL (ref 65–100)
GLUCOSE UR STRIP.AUTO-MCNC: NEGATIVE MG/DL
HCT VFR BLD AUTO: 43.7 % (ref 35.8–46.3)
HGB BLD-MCNC: 14.1 G/DL (ref 11.7–15.4)
HGB UR QL STRIP: ABNORMAL
IMM GRANULOCYTES # BLD AUTO: 0.1 K/UL (ref 0–0.5)
IMM GRANULOCYTES NFR BLD AUTO: 1 % (ref 0–5)
KETONES UR QL STRIP.AUTO: NEGATIVE MG/DL
LEUKOCYTE ESTERASE UR QL STRIP.AUTO: NEGATIVE
LIPASE SERPL-CCNC: 83 U/L (ref 73–393)
LYMPHOCYTES # BLD: 6.5 K/UL (ref 0.5–4.6)
LYMPHOCYTES NFR BLD: 37 % (ref 13–44)
MCH RBC QN AUTO: 26.9 PG (ref 26.1–32.9)
MCHC RBC AUTO-ENTMCNC: 32.3 G/DL (ref 31.4–35)
MCV RBC AUTO: 83.4 FL (ref 79.6–97.8)
MONOCYTES # BLD: 0.6 K/UL (ref 0.1–1.3)
MONOCYTES NFR BLD: 4 % (ref 4–12)
NEUTS SEG # BLD: 10 K/UL (ref 1.7–8.2)
NEUTS SEG NFR BLD: 57 % (ref 43–78)
NITRITE UR QL STRIP.AUTO: NEGATIVE
NRBC # BLD: 0 K/UL (ref 0–0.2)
PH UR STRIP: 7 [PH] (ref 5–9)
PLATELET # BLD AUTO: 237 K/UL (ref 150–450)
PMV BLD AUTO: 10.6 FL (ref 9.4–12.3)
POTASSIUM SERPL-SCNC: 3.7 MMOL/L (ref 3.5–5.1)
PROT SERPL-MCNC: 7.2 G/DL (ref 6.3–8.2)
PROT UR STRIP-MCNC: NEGATIVE MG/DL
RBC # BLD AUTO: 5.24 M/UL (ref 4.05–5.2)
RBC #/AREA URNS HPF: ABNORMAL /HPF
SODIUM SERPL-SCNC: 141 MMOL/L (ref 136–145)
SP GR UR REFRACTOMETRY: 1.01 (ref 1–1.02)
UROBILINOGEN UR QL STRIP.AUTO: 0.2 EU/DL (ref 0.2–1)
WBC # BLD AUTO: 17.5 K/UL (ref 4.3–11.1)
WBC URNS QL MICRO: 0 /HPF

## 2022-06-27 PROCEDURE — 80053 COMPREHEN METABOLIC PANEL: CPT

## 2022-06-27 PROCEDURE — 96375 TX/PRO/DX INJ NEW DRUG ADDON: CPT

## 2022-06-27 PROCEDURE — 96374 THER/PROPH/DIAG INJ IV PUSH: CPT

## 2022-06-27 PROCEDURE — 85025 COMPLETE CBC W/AUTO DIFF WBC: CPT

## 2022-06-27 PROCEDURE — 74177 CT ABD & PELVIS W/CONTRAST: CPT

## 2022-06-27 PROCEDURE — 99284 EMERGENCY DEPT VISIT MOD MDM: CPT

## 2022-06-27 PROCEDURE — 2580000003 HC RX 258: Performed by: EMERGENCY MEDICINE

## 2022-06-27 PROCEDURE — 6370000000 HC RX 637 (ALT 250 FOR IP): Performed by: EMERGENCY MEDICINE

## 2022-06-27 PROCEDURE — 81001 URINALYSIS AUTO W/SCOPE: CPT

## 2022-06-27 PROCEDURE — 6360000004 HC RX CONTRAST MEDICATION: Performed by: EMERGENCY MEDICINE

## 2022-06-27 PROCEDURE — 6360000002 HC RX W HCPCS: Performed by: EMERGENCY MEDICINE

## 2022-06-27 PROCEDURE — 83690 ASSAY OF LIPASE: CPT

## 2022-06-27 RX ORDER — 0.9 % SODIUM CHLORIDE 0.9 %
100 INTRAVENOUS SOLUTION INTRAVENOUS ONCE
Status: COMPLETED | OUTPATIENT
Start: 2022-06-27 | End: 2022-06-27

## 2022-06-27 RX ORDER — CIPROFLOXACIN 500 MG/1
500 TABLET, FILM COATED ORAL 2 TIMES DAILY
Qty: 28 TABLET | Refills: 0 | Status: SHIPPED | OUTPATIENT
Start: 2022-06-27 | End: 2022-07-11

## 2022-06-27 RX ORDER — CIPROFLOXACIN 500 MG/1
500 TABLET, FILM COATED ORAL ONCE
Status: COMPLETED | OUTPATIENT
Start: 2022-06-27 | End: 2022-06-27

## 2022-06-27 RX ORDER — MORPHINE SULFATE 4 MG/ML
4 INJECTION, SOLUTION INTRAMUSCULAR; INTRAVENOUS
Status: COMPLETED | OUTPATIENT
Start: 2022-06-27 | End: 2022-06-27

## 2022-06-27 RX ORDER — METRONIDAZOLE 500 MG/1
500 TABLET ORAL
Status: COMPLETED | OUTPATIENT
Start: 2022-06-27 | End: 2022-06-27

## 2022-06-27 RX ORDER — ONDANSETRON 2 MG/ML
4 INJECTION INTRAMUSCULAR; INTRAVENOUS ONCE
Status: COMPLETED | OUTPATIENT
Start: 2022-06-27 | End: 2022-06-27

## 2022-06-27 RX ORDER — METRONIDAZOLE 500 MG/1
500 TABLET ORAL 3 TIMES DAILY
Qty: 42 TABLET | Refills: 0 | Status: SHIPPED | OUTPATIENT
Start: 2022-06-27 | End: 2022-07-11

## 2022-06-27 RX ORDER — TRAMADOL HYDROCHLORIDE 50 MG/1
50 TABLET ORAL EVERY 6 HOURS PRN
Qty: 12 TABLET | Refills: 0 | Status: SHIPPED | OUTPATIENT
Start: 2022-06-27 | End: 2022-06-30

## 2022-06-27 RX ORDER — SODIUM CHLORIDE 0.9 % (FLUSH) 0.9 %
3 SYRINGE (ML) INJECTION EVERY 8 HOURS
Status: DISCONTINUED | OUTPATIENT
Start: 2022-06-27 | End: 2022-06-27 | Stop reason: HOSPADM

## 2022-06-27 RX ORDER — SODIUM CHLORIDE 0.9 % (FLUSH) 0.9 %
10 SYRINGE (ML) INJECTION 2 TIMES DAILY
Status: DISCONTINUED | OUTPATIENT
Start: 2022-06-27 | End: 2022-07-01 | Stop reason: HOSPADM

## 2022-06-27 RX ADMIN — ONDANSETRON 4 MG: 2 INJECTION INTRAMUSCULAR; INTRAVENOUS at 18:39

## 2022-06-27 RX ADMIN — IOPAMIDOL 100 ML: 755 INJECTION, SOLUTION INTRAVENOUS at 19:32

## 2022-06-27 RX ADMIN — SODIUM CHLORIDE 100 ML: 9 INJECTION, SOLUTION INTRAVENOUS at 19:33

## 2022-06-27 RX ADMIN — MORPHINE SULFATE 4 MG: 4 INJECTION INTRAVENOUS at 18:39

## 2022-06-27 RX ADMIN — METRONIDAZOLE 500 MG: 500 TABLET ORAL at 20:29

## 2022-06-27 RX ADMIN — CIPROFLOXACIN 500 MG: 500 TABLET, FILM COATED ORAL at 20:29

## 2022-06-27 ASSESSMENT — PAIN DESCRIPTION - LOCATION
LOCATION: ABDOMEN

## 2022-06-27 ASSESSMENT — PAIN - FUNCTIONAL ASSESSMENT
PAIN_FUNCTIONAL_ASSESSMENT: 0-10
PAIN_FUNCTIONAL_ASSESSMENT: ACTIVITIES ARE NOT PREVENTED
PAIN_FUNCTIONAL_ASSESSMENT: 0-10

## 2022-06-27 ASSESSMENT — PAIN SCALES - GENERAL
PAINLEVEL_OUTOF10: 7
PAINLEVEL_OUTOF10: 9
PAINLEVEL_OUTOF10: 7

## 2022-06-27 ASSESSMENT — PAIN DESCRIPTION - ORIENTATION: ORIENTATION: LEFT

## 2022-06-27 ASSESSMENT — ENCOUNTER SYMPTOMS
BELCHING: 0
ABDOMINAL PAIN: 1

## 2022-06-27 ASSESSMENT — PAIN DESCRIPTION - FREQUENCY: FREQUENCY: CONTINUOUS

## 2022-06-27 ASSESSMENT — PAIN DESCRIPTION - DESCRIPTORS: DESCRIPTORS: CRAMPING

## 2022-06-27 ASSESSMENT — PAIN DESCRIPTION - PAIN TYPE: TYPE: ACUTE PAIN

## 2022-06-27 NOTE — TELEPHONE ENCOUNTER
The patient called asking for a surgical referral. States she has a strangulated hernia. C/O pain and also had chills this morning. I talked with DR Odalis Darby he said she needs to go to the ER. Patient advised.

## 2022-06-27 NOTE — ED PROVIDER NOTES
[facility] Emergency Department Provider Note                   PCP:                Margareth Coon DO               Age: 62 y.o. Sex: female     No diagnosis found. DISPOSITION         New Prescriptions    No medications on file       Orders Placed This Encounter   Procedures    CT ABDOMEN PELVIS W IV CONTRAST Additional Contrast? Oral    CBC with Diff    CMP    Lipase    Urinalysis    Diet NPO    POCT Urine Dipstick    Saline lock IV        Moiz Vallejo MD 6:16 PM      MDM  Number of Diagnoses or Management Options  Diagnosis management comments: Patient has sigmoid diverticulitis with no complication. We will DC home and outpatient follow-up with her PCP ASAP. Amount and/or Complexity of Data Reviewed  Clinical lab tests: ordered and reviewed  Tests in the radiology section of CPT®: ordered and reviewed  Review and summarize past medical records: yes  Independent visualization of images, tracings, or specimens: yes    Risk of Complications, Morbidity, and/or Mortality  Presenting problems: moderate  Management options: moderate    Patient Progress  Patient progress: stable       Elo Gutierrez is a 62 y.o. female who presents to the Emergency Department with chief complaint of    Chief Complaint   Patient presents with    Abdominal Pain      Patient is a 62-year-old female with a history of  25 years ago with a abdominal wall hernia 20 years ago status post repair in 6 months after that she required a abdominal wall mesh who now presents with left lower abdominal pain constant in nature since 10:30 AM today. Patient states the pain is a 8 out of 10 worse with movement and touch. Patient states it occurred while she was doing water aerobics.       Abdominal Pain  Pain location:  LLQ  Pain quality: aching and dull    Pain radiates to:  Does not radiate  Pain severity:  Moderate  Onset quality:  Gradual  Duration:  8 hours  Timing:  Constant  Progression:  Waxing and waning  Chronicity:  New  Context: previous surgery    Context: not recent illness, not recent travel and not sick contacts    Relieved by:  Nothing  Worsened by: Movement  Ineffective treatments:  None tried  Associated symptoms: no anorexia and no belching        All other systems reviewed and are negative. Review of Systems   Gastrointestinal: Positive for abdominal pain. Negative for anorexia. All other systems reviewed and are negative.       Past Medical History:   Diagnosis Date    Allergic rhinitis, cause unspecified     Arthritis     \"everywhere\"    Cancer ([facility] [address]     thyroid- dx     Chronic pain     knees    Depressive disorder, not elsewhere classified     Endometrial cancer ([facility] [address]     dx 10/3/16    Impaired fasting glucose     Morbid obesity (HCC)     BMI- 37 10/19/16    Nausea & vomiting     POV but \"The last surgery was the best\"- D&C 16    Other malaise and fatigue     Postsurgical hypothyroidism     Tension headache     Throat pain     Type II or unspecified type diabetes mellitus without mention of complication, not stated as uncontrolled     checks fastings almost daily-avg 108- today (10/19/16)= 90- 5 hrs pc; last A1C= 6.0 in 2016         Past Surgical History:   Procedure Laterality Date    BREAST REDUCTION SURGERY       SECTION       x 3    CHOLECYSTECTOMY      DILATION AND CURETTAGE OF UTERUS  2016    GYN      ovarian cyst removed    GYN  10/2016    hyst/BSO    HERNIA REPAIR      #2 - mesh    THYROIDECTOMY, PARTIAL          Family History   Problem Relation Age of Onset    No Known Problems Sister     No Known Problems Brother     No Known Problems Brother     No Known Problems Brother     No Known Problems Brother     No Known Problems Brother     No Known Problems Brother     No Known Problems Brother     Migraines Father     Heart Disease Father         MI    Cancer Brother         skin    Hypertension Mother     Stroke Mother TIA    No Known Problems Sister     No Known Problems Sister            Social Connections:     Frequency of Communication with Friends and Family: Not on file    Frequency of Social Gatherings with Friends and Family: Not on file    Attends Evangelical Services: Not on file    Active Member of Clubs or Organizations: Not on file    Attends Club or Organization Meetings: Not on file    Marital Status: Not on file        Allergies   Allergen Reactions    Latex Other (See Comments)     Rips skin off        Vitals signs and nursing note reviewed. Patient Vitals for the past 4 hrs:   Temp Pulse Resp BP SpO2   06/27/22 1803 -- 79 17 (!) 148/75 98 %   06/27/22 1648 98.9 °F (37.2 °C) 81 16 (!) 150/77 97 %          Physical Exam  Vitals and nursing note reviewed. Constitutional:       Appearance: Normal appearance. HENT:      Head: Normocephalic and atraumatic. Nose: Nose normal.      Mouth/Throat:      Mouth: Mucous membranes are dry. Pharynx: Oropharynx is clear. Eyes:      Extraocular Movements: Extraocular movements intact. Conjunctiva/sclera: Conjunctivae normal.      Pupils: Pupils are equal, round, and reactive to light. Cardiovascular:      Rate and Rhythm: Normal rate. Pulses: Normal pulses. Pulmonary:      Effort: Pulmonary effort is normal.   Abdominal:      General: Abdomen is flat. Bowel sounds are normal.      Palpations: Abdomen is soft. Musculoskeletal:         General: Normal range of motion. Cervical back: Normal range of motion and neck supple. Skin:     General: Skin is warm and dry. Capillary Refill: Capillary refill takes less than 2 seconds. Neurological:      General: No focal deficit present. Mental Status: She is alert and oriented to person, place, and time. Mental status is at baseline. Psychiatric:         Mood and Affect: Mood normal.         Behavior: Behavior normal.         Thought Content:  Thought content normal. Judgment: Judgment normal.          Procedures        Labs Reviewed   CBC WITH AUTO DIFFERENTIAL - Abnormal; Notable for the following components:       Result Value    WBC 17.5 (*)     RBC 5.24 (*)     Segs Absolute 10.0 (*)     Absolute Lymph # 6.5 (*)     All other components within normal limits   COMPREHENSIVE METABOLIC PANEL - Abnormal; Notable for the following components:    Anion Gap 6 (*)     Glucose 117 (*)     AST 12 (*)     Albumin/Globulin Ratio 1.1 (*)     All other components within normal limits   URINALYSIS - Abnormal; Notable for the following components:    Blood, Urine TRACE (*)     All other components within normal limits   LIPASE        CT ABDOMEN PELVIS W IV CONTRAST Additional Contrast? Oral    (Results Pending)                            Voice dictation software was used during the making of this note. This software is not perfect and grammatical and other typographical errors may be present. This note has not been completely proofread for errors.         Jeffrey Alejandro MD  06/27/22 3951

## 2022-06-28 DIAGNOSIS — C54.1 ENDOMETRIAL CANCER (HCC): Primary | ICD-10-CM

## 2022-06-28 NOTE — ED NOTES
I have reviewed discharge instructions with the patient. The patient verbalized understanding. Patient left ED via Discharge Method: ambulatory to Home with self. Opportunity for questions and clarification provided. Patient given 3 scripts. To continue your aftercare when you leave the hospital, you may receive an automated call from our care team to check in on how you are doing. This is a free service and part of our promise to provide the best care and service to meet your aftercare needs.  If you have questions, or wish to unsubscribe from this service please call [phone]. Thank you for Choosing our [facility] Emergency Department.         Cary Baldwin, MARY  06/27/22 1005

## 2022-07-13 ENCOUNTER — COMMUNITY OUTREACH (OUTPATIENT)
Dept: INTERNAL MEDICINE CLINIC | Facility: CLINIC | Age: 62
End: 2022-07-13

## 2022-07-13 NOTE — PROGRESS NOTES
Patient's HM shows they are overdue for  Colonoscopy. [employer] and  files searched with  success.   Results attached to order and HM updated

## 2022-07-18 ENCOUNTER — COMMUNITY OUTREACH (OUTPATIENT)
Dept: INTERNAL MEDICINE CLINIC | Facility: CLINIC | Age: 62
End: 2022-07-18

## 2022-07-18 NOTE — PROGRESS NOTES
Patient's HM shows they are overdue for mammogram.   [facility] and  files searched  without success. Geeta Angles

## 2022-08-16 ENCOUNTER — HOSPITAL ENCOUNTER (OUTPATIENT)
Dept: LAB | Age: 62
Discharge: HOME | End: 2022-08-19

## 2022-08-16 PROCEDURE — 88312 SPECIAL STAINS GROUP 1: CPT

## 2022-08-16 PROCEDURE — 88305 TISSUE EXAM BY PATHOLOGIST: CPT

## 2022-08-30 DIAGNOSIS — C54.1 ENDOMETRIAL CANCER (HCC): Primary | ICD-10-CM

## 2022-08-30 NOTE — PROGRESS NOTES
Date: 2022        Patient Name: Judy Colbert     YOB: 1960          Chief Complaint     Chief Complaint   Patient presents with    Follow-up     Stage 1a gr 1 Endometrial cancer-txed     MSI (-)                History of Present Illness   Judy Clobert is a 62 y.o. who presents today for follow up. She underwent robotic hyst/BSO/staging 10/2016 and final path revealed Stage IA Grade 1 endometrial cancer. She states she's done well since her surgery and she has no GI//Pulm or CV complaints today. She is having a some back pain but since covid she has stopped massages and water aerobics. Pain present since march. It gets better through out the day. She is seeing PCP for this and she's starting exercise program.        She sees her PCP regularly.        Last pap NIL   mammo due and ordered through PCP   Colonoscopy normal and current per pt  Past Medical History     Past Medical History:   Diagnosis Date    Allergic rhinitis, cause unspecified     Arthritis     \"everywhere\"    Cancer ([address]     thyroid- dx     Chronic pain     knees    Depressive disorder, not elsewhere classified     Endometrial cancer ([address]     dx 10/3/16    Impaired fasting glucose     Morbid obesity ([address]     BMI- 37 10/19/16    Nausea & vomiting     POV but \"The last surgery was the best\"- D&C 16    Other malaise and fatigue     Postsurgical hypothyroidism     Tension headache     Throat pain     Type II or unspecified type diabetes mellitus without mention of complication, not stated as uncontrolled     checks fastings almost daily-avg 108- today (10/19/16)= 90- 5 hrs pc; last A1C= 6.0 in 2016         Past Surgical History     Past Surgical History:   Procedure Laterality Date    BREAST REDUCTION SURGERY       SECTION       x 3    CHOLECYSTECTOMY      DILATION AND CURETTAGE OF UTERUS  2016    GYN      ovarian cyst removed    GYN  10/2016    hyst/BSO HERNIA REPAIR      #2 - mesh    THYROIDECTOMY, PARTIAL          Medications      Current Outpatient Medications:     albuterol sulfate  (90 Base) MCG/ACT inhaler, Inhale 1 puff into the lungs every 4 hours as needed, Disp: , Rfl:     albuterol (PROVENTIL) (2.5 MG/3ML) 0.083% nebulizer solution, Inhale 2.5 mg into the lungs every 6 hours as needed, Disp: , Rfl:     benzonatate (TESSALON) 100 MG capsule, 1-2 up to tid prn, Disp: , Rfl:     cetirizine (ZYRTEC) 10 MG tablet, Take 10 mg by mouth daily, Disp: , Rfl:     fluticasone (FLONASE) 50 MCG/ACT nasal spray, 2 sprays by Nasal route daily, Disp: , Rfl:     levothyroxine (SYNTHROID) 175 MCG tablet, Take 175 mcg by mouth every morning (before breakfast), Disp: , Rfl:     montelukast (SINGULAIR) 10 MG tablet, Take 10 mg by mouth, Disp: , Rfl:     nystatin (MYCOSTATIN) 914506 UNIT/GM cream, Apply topically 2 times daily, Disp: , Rfl:     calcium carbonate (OYSTER SHELL CALCIUM 500 MG) 1250 (500 Ca) MG tablet, Take 1,000 mg by mouth, Disp: , Rfl:     Cholecalciferol 1.25 MG (12010 UT) TABS, Take by mouth, Disp: , Rfl:     Magnesium Oxide (MAG-CAPS PO), Take 1,500 mg by mouth daily, Disp: , Rfl:     Turmeric 500 MG TABS, Take 500 mg by mouth daily, Disp: , Rfl:     sertraline (ZOLOFT) 50 MG tablet, Take 50 mg by mouth, Disp: , Rfl:      Allergies   Latex    Social History     Social History       Tobacco History       Smoking Status  Never      Smokeless Tobacco Use  Never              Alcohol History       Alcohol Use Status  No              Drug Use       Drug Use Status  No              Sexual Activity       Sexually Active  Not Asked                    Family History     Family History   Problem Relation Age of Onset    No Known Problems Sister     No Known Problems Brother     No Known Problems Brother     No Known Problems Brother     No Known Problems Brother     No Known Problems Brother     No Known Problems Brother     No Known Problems Brother Migraines Father     Heart Disease Father         MI    Cancer Brother         skin    Hypertension Mother     Stroke Mother         TIA    No Known Problems Sister     No Known Problems Sister        Review of Systems   Review of Systems   Constitutional: Negative. HENT: Negative. Eyes: Negative. Respiratory: Negative. Cardiovascular: Negative. Gastrointestinal: Negative. Endocrine: Negative. Genitourinary: Negative. Musculoskeletal: Negative. Skin: Negative. Allergic/Immunologic: Negative. Neurological: Negative. Hematological: Negative. Psychiatric/Behavioral: Negative. All other systems reviewed and are negative. Physical Exam     ECOG PERFORMANCE STATUS - 0-Fully active, able to carry on all pre-disease performance without restriction. Blood pressure (!) 142/82, pulse 72, temperature 98 °F (36.7 °C), resp. rate 16, height 5' 7\" (1.702 m), weight 232 lb 12.8 oz (105.6 kg), SpO2 97 %. Physical Exam  Vitals and nursing note reviewed. Exam conducted with a chaperone present. Constitutional:       Appearance: Normal appearance. She is obese. HENT:      Head: Normocephalic and atraumatic. Cardiovascular:      Rate and Rhythm: Normal rate and regular rhythm. Heart sounds: Normal heart sounds. Pulmonary:      Effort: Pulmonary effort is normal. No respiratory distress. Breath sounds: Normal breath sounds. Abdominal:      General: Abdomen is flat. Palpations: Abdomen is soft. Genitourinary:     General: Normal vulva. Vagina: No vaginal discharge. Neurological:      General: No focal deficit present. Mental Status: She is alert and oriented to person, place, and time. Psychiatric:         Mood and Affect: Mood normal.         Behavior: Behavior normal.         Thought Content: Thought content normal.         Judgment: Judgment normal.       Labs        No results found for this or any previous visit (from the past 48 hour(s)). No results found for:      Pathology    Vaginal, Thin Prep Pap Test:     Satisfactory for evaluation. NEGATIVE FOR INTRAEPITHELIAL LESION OR MALIGNANCY           6/25/2021 14:40 by     Imaging/Diagnostics Last 24 Hours   No results found. Radiology:    CT Result (most recent):  CT ABDOMEN PELVIS W IV CONTRAST 06/27/2022    Narrative  CT OF THE ABDOMEN AND PELVIS WITH CONTRAST:    CLINICAL HISTORY:   Severe left lower quadrant abdominal pain with chills. Now  with leukocytosis and trace microscopic hematuria. TECHNIQUE:  Oral and nonionic intravenous contrast was administered, and the  abdomen and pelvis were scanned with spiral technique. Radiation dose reduction  was achieved using one or all of the following techniques: automated exposure  control, weight-based dosing, iterative reconstruction. COMPARISON:  None. CT ABDOMEN FINDINGS:  The lung bases are clear as imaged. No significant  biliary ductal dilatation status post cholecystectomy. The liver, spleen,  pancreas, adrenals, and kidneys appear unremarkable. CT PELVIS FINDINGS:   The appendix is not confidently identified, but there are  no secondary signs of appendicitis. There are a few sigmoid diverticula with a  focal area of wall thickening and soft tissue infiltration in the paracolic fat  suspicious for diverticulitis in the proximal sigmoid. No associated fluid  collection or free intraperitoneal air. No pathologically enlarged lymph nodes  or free fluid is evident. No adnexal mass is seen status post hysterectomy. Bone windows demonstrate no definite aggressive process, accounting for  degenerative changes. Impression  SIGMOID DIVERTICULITIS WITHOUT REMINGTON ABSCESS OR PERFORATION. PET Results (most recent):  No results found for this or any previous visit from the past 365 days. Mammo Results (most recent):  No results found for this or any previous visit from the past 365 days.          US Result (most recent):  No results found for this or any previous visit from the past 3650 days. Assessment       Diagnosis Orders   1. History of cancer of uterus          Specialty Problems    None      Plan   1.fu pap  Fu here prn, gyn exam prn  Cont with pcp mammos and colon surv.   Olga Anthony today              Allison Almazan MD  [facility]  [address] [id number]  Office : [phone]  Fax : [phone]

## 2022-08-31 ENCOUNTER — OFFICE VISIT (OUTPATIENT)
Dept: ONCOLOGY | Age: 62
End: 2022-08-31

## 2022-08-31 ENCOUNTER — HOSPITAL ENCOUNTER (OUTPATIENT)
Dept: LAB | Age: 62
Discharge: HOME | End: 2022-09-03

## 2022-08-31 VITALS
DIASTOLIC BLOOD PRESSURE: 82 MMHG | HEIGHT: 67 IN | SYSTOLIC BLOOD PRESSURE: 142 MMHG | WEIGHT: 232.8 LBS | HEART RATE: 72 BPM | OXYGEN SATURATION: 97 % | TEMPERATURE: 98 F | RESPIRATION RATE: 16 BRPM | BODY MASS INDEX: 36.54 KG/M2

## 2022-08-31 DIAGNOSIS — Z85.42 HISTORY OF CANCER OF UTERUS: Primary | ICD-10-CM

## 2022-08-31 PROCEDURE — 88142 CYTOPATH C/V THIN LAYER: CPT

## 2022-08-31 PROCEDURE — 99212 OFFICE O/P EST SF 10 MIN: CPT | Performed by: OBSTETRICS & GYNECOLOGY

## 2022-08-31 RX ORDER — IBUPROFEN 200 MG
1000 CAPSULE ORAL
COMMUNITY

## 2022-08-31 RX ORDER — TURMERIC ROOT EXTRACT 500 MG
500 TABLET ORAL DAILY
COMMUNITY

## 2022-08-31 ASSESSMENT — PATIENT HEALTH QUESTIONNAIRE - PHQ9
SUM OF ALL RESPONSES TO PHQ QUESTIONS 1-9: 0
SUM OF ALL RESPONSES TO PHQ9 QUESTIONS 1 & 2: 0
9. THOUGHTS THAT YOU WOULD BE BETTER OFF DEAD, OR OF HURTING YOURSELF: 0
7. TROUBLE CONCENTRATING ON THINGS, SUCH AS READING THE NEWSPAPER OR WATCHING TELEVISION: 0
1. LITTLE INTEREST OR PLEASURE IN DOING THINGS: 0
5. POOR APPETITE OR OVEREATING: 0
SUM OF ALL RESPONSES TO PHQ QUESTIONS 1-9: 0
SUM OF ALL RESPONSES TO PHQ QUESTIONS 1-9: 0
8. MOVING OR SPEAKING SO SLOWLY THAT OTHER PEOPLE COULD HAVE NOTICED. OR THE OPPOSITE, BEING SO FIGETY OR RESTLESS THAT YOU HAVE BEEN MOVING AROUND A LOT MORE THAN USUAL: 0
6. FEELING BAD ABOUT YOURSELF - OR THAT YOU ARE A FAILURE OR HAVE LET YOURSELF OR YOUR FAMILY DOWN: 0
2. FEELING DOWN, DEPRESSED OR HOPELESS: 0
4. FEELING TIRED OR HAVING LITTLE ENERGY: 0
3. TROUBLE FALLING OR STAYING ASLEEP: 0
SUM OF ALL RESPONSES TO PHQ QUESTIONS 1-9: 0

## 2022-08-31 ASSESSMENT — ANXIETY QUESTIONNAIRES
1. FEELING NERVOUS, ANXIOUS, OR ON EDGE: 0
GAD7 TOTAL SCORE: 0
6. BECOMING EASILY ANNOYED OR IRRITABLE: 0
2. NOT BEING ABLE TO STOP OR CONTROL WORRYING: 0
7. FEELING AFRAID AS IF SOMETHING AWFUL MIGHT HAPPEN: 0
3. WORRYING TOO MUCH ABOUT DIFFERENT THINGS: 0
4. TROUBLE RELAXING: 0
5. BEING SO RESTLESS THAT IT IS HARD TO SIT STILL: 0
IF YOU CHECKED OFF ANY PROBLEMS ON THIS QUESTIONNAIRE, HOW DIFFICULT HAVE THESE PROBLEMS MADE IT FOR YOU TO DO YOUR WORK, TAKE CARE OF THINGS AT HOME, OR GET ALONG WITH OTHER PEOPLE: NOT DIFFICULT AT ALL

## 2022-08-31 ASSESSMENT — ENCOUNTER SYMPTOMS
EYES NEGATIVE: 1
RESPIRATORY NEGATIVE: 1
GASTROINTESTINAL NEGATIVE: 1
ALLERGIC/IMMUNOLOGIC NEGATIVE: 1

## 2022-11-03 DIAGNOSIS — D72.829 LEUKOCYTOSIS, UNSPECIFIED TYPE: ICD-10-CM

## 2022-11-03 DIAGNOSIS — R73.01 IMPAIRED FASTING GLUCOSE: Primary | ICD-10-CM

## 2022-11-07 ENCOUNTER — NURSE ONLY (OUTPATIENT)
Dept: INTERNAL MEDICINE CLINIC | Facility: CLINIC | Age: 62
End: 2022-11-07

## 2022-11-07 DIAGNOSIS — R73.01 IMPAIRED FASTING GLUCOSE: ICD-10-CM

## 2022-11-07 DIAGNOSIS — D72.829 LEUKOCYTOSIS, UNSPECIFIED TYPE: ICD-10-CM

## 2022-11-07 LAB
ANION GAP SERPL CALC-SCNC: 2 MMOL/L (ref 2–11)
BUN SERPL-MCNC: 14 MG/DL (ref 8–23)
CALCIUM SERPL-MCNC: 9.6 MG/DL (ref 8.3–10.4)
CHLORIDE SERPL-SCNC: 103 MMOL/L (ref 101–110)
CO2 SERPL-SCNC: 32 MMOL/L (ref 21–32)
CREAT SERPL-MCNC: 0.8 MG/DL (ref 0.6–1)
ERYTHROCYTE [DISTWIDTH] IN BLOOD BY AUTOMATED COUNT: 15 % (ref 11.9–14.6)
GLUCOSE SERPL-MCNC: 147 MG/DL (ref 65–100)
HCT VFR BLD AUTO: 47.5 % (ref 35.8–46.3)
HGB BLD-MCNC: 14.7 G/DL (ref 11.7–15.4)
MCH RBC QN AUTO: 27.3 PG (ref 26.1–32.9)
MCHC RBC AUTO-ENTMCNC: 30.9 G/DL (ref 31.4–35)
MCV RBC AUTO: 88.1 FL (ref 82–102)
NRBC # BLD: 0 K/UL (ref 0–0.2)
PLATELET # BLD AUTO: 270 K/UL (ref 150–450)
PMV BLD AUTO: 12.2 FL (ref 9.4–12.3)
POTASSIUM SERPL-SCNC: 4.3 MMOL/L (ref 3.5–5.1)
RBC # BLD AUTO: 5.39 M/UL (ref 4.05–5.2)
SODIUM SERPL-SCNC: 137 MMOL/L (ref 133–143)
WBC # BLD AUTO: 12.3 K/UL (ref 4.3–11.1)

## 2022-11-08 LAB
EST. AVERAGE GLUCOSE BLD GHB EST-MCNC: 140 MG/DL
HBA1C MFR BLD: 6.5 % (ref 4.8–5.6)

## 2022-11-14 ENCOUNTER — OFFICE VISIT (OUTPATIENT)
Dept: INTERNAL MEDICINE CLINIC | Facility: CLINIC | Age: 62
End: 2022-11-14

## 2022-11-14 VITALS
HEART RATE: 78 BPM | BODY MASS INDEX: 37.43 KG/M2 | SYSTOLIC BLOOD PRESSURE: 132 MMHG | WEIGHT: 239 LBS | OXYGEN SATURATION: 98 % | DIASTOLIC BLOOD PRESSURE: 88 MMHG

## 2022-11-14 DIAGNOSIS — I10 PRIMARY HYPERTENSION: ICD-10-CM

## 2022-11-14 DIAGNOSIS — E11.9 TYPE 2 DIABETES MELLITUS WITHOUT COMPLICATION, WITHOUT LONG-TERM CURRENT USE OF INSULIN (HCC): ICD-10-CM

## 2022-11-14 DIAGNOSIS — D72.829 LEUKOCYTOSIS, UNSPECIFIED TYPE: Primary | ICD-10-CM

## 2022-11-14 DIAGNOSIS — F32.A DEPRESSION, UNSPECIFIED DEPRESSION TYPE: ICD-10-CM

## 2022-11-14 DIAGNOSIS — Z12.31 ENCOUNTER FOR SCREENING MAMMOGRAM FOR MALIGNANT NEOPLASM OF BREAST: ICD-10-CM

## 2022-11-14 DIAGNOSIS — E03.9 ACQUIRED HYPOTHYROIDISM: ICD-10-CM

## 2022-11-14 DIAGNOSIS — E78.2 MIXED HYPERLIPIDEMIA: ICD-10-CM

## 2022-11-14 PROCEDURE — 3075F SYST BP GE 130 - 139MM HG: CPT | Performed by: INTERNAL MEDICINE

## 2022-11-14 PROCEDURE — 99213 OFFICE O/P EST LOW 20 MIN: CPT | Performed by: INTERNAL MEDICINE

## 2022-11-14 PROCEDURE — 3078F DIAST BP <80 MM HG: CPT | Performed by: INTERNAL MEDICINE

## 2022-11-14 PROCEDURE — 3044F HG A1C LEVEL LT 7.0%: CPT | Performed by: INTERNAL MEDICINE

## 2022-11-14 RX ORDER — MONTELUKAST SODIUM 10 MG/1
10 TABLET ORAL NIGHTLY
Qty: 30 TABLET | Refills: 11 | Status: CANCELLED | OUTPATIENT
Start: 2022-11-14

## 2022-11-14 RX ORDER — MECLIZINE HYDROCHLORIDE 25 MG/1
25 TABLET ORAL 3 TIMES DAILY PRN
Qty: 30 TABLET | Refills: 1 | Status: SHIPPED | OUTPATIENT
Start: 2022-11-14 | End: 2022-11-24

## 2022-11-14 RX ORDER — LEVOTHYROXINE SODIUM 175 UG/1
175 TABLET ORAL
Qty: 30 TABLET | Refills: 11 | Status: SHIPPED | OUTPATIENT
Start: 2022-11-14

## 2022-11-14 RX ORDER — NYSTATIN 100000 U/G
CREAM TOPICAL 2 TIMES DAILY
Qty: 15 G | Refills: 5 | Status: SHIPPED | OUTPATIENT
Start: 2022-11-14

## 2022-11-14 ASSESSMENT — ENCOUNTER SYMPTOMS
ABDOMINAL PAIN: 0
VOMITING: 0
WHEEZING: 0
CONSTIPATION: 0
NAUSEA: 0
DIARRHEA: 0
SINUS PAIN: 0
SHORTNESS OF BREATH: 0

## 2022-11-14 NOTE — PROGRESS NOTES
Izabella Later was seen today for follow-up. Diagnoses and all orders for this visit:    Leukocytosis, unspecified type  -     CBC with Auto Differential; Future    Encounter for screening mammogram for malignant neoplasm of breast  -     ZEYNEP DIGITAL SCREEN W OR WO CAD BILATERAL; Future  -     CBC; Future  -     Comprehensive Metabolic Panel; Future  -     Hemoglobin A1C; Future  -     TSH; Future  -     Lipid Panel; Future    Type 2 diabetes mellitus without complication, without long-term current use of insulin (HCC)  -     CBC; Future  -     Comprehensive Metabolic Panel; Future  -     Hemoglobin A1C; Future  -     TSH; Future  -     Lipid Panel; Future  -     Ferritin; Future  -     Transferrin Saturation; Future  -     IRAM and PE, Serum; Future    Acquired hypothyroidism  -     levothyroxine (SYNTHROID) 175 MCG tablet; Take 1 tablet by mouth every morning (before breakfast)  -     CBC; Future  -     Comprehensive Metabolic Panel; Future  -     Hemoglobin A1C; Future  -     TSH; Future  -     Lipid Panel; Future    Mixed hyperlipidemia    Primary hypertension    Depression, unspecified depression type  -     sertraline (ZOLOFT) 50 MG tablet; Take 1 tablet by mouth daily    Other orders  -     nystatin (MYCOSTATIN) 404878 UNIT/GM cream; Apply topically 2 times daily  -     meclizine (ANTIVERT) 25 MG tablet; Take 1 tablet by mouth 3 times daily as needed for Dizziness        Shanika Castillo is a 65 y.o. female    Chief Complaint   Patient presents with    Follow-up     Check up and review labs DM, HTN   Mild worsening,on metformin, no hypogylceima.     Lab Results   Component Value Date    LABA1C 6.5 (H) 11/07/2022    LABA1C 6.3 (H) 04/28/2022    LABA1C 5.9 (H) 12/08/2020     Lab Results   Component Value Date    CREATININE 0.80 11/07/2022           Nurse Only on 11/07/2022   Component Date Value Ref Range Status    Sodium 11/07/2022 137  133 - 143 mmol/L Final    Potassium 11/07/2022 4.3  3.5 - 5.1 mmol/L Final Chloride 11/07/2022 103  101 - 110 mmol/L Final    CO2 11/07/2022 32  21 - 32 mmol/L Final    Anion Gap 11/07/2022 2  2 - 11 mmol/L Final    Glucose 11/07/2022 147 (A)  65 - 100 mg/dL Final    BUN 11/07/2022 14  8 - 23 MG/DL Final    Creatinine 11/07/2022 0.80  0.6 - 1.0 MG/DL Final    Est, Glom Filt Rate 11/07/2022 >60  >60 ml/min/1.73m2 Final    Comment:   Pediatric calculator link: [email or web address] org/professionals/kdoqi/gfr_calculatorped    Effective Oct 3, 2022    These results are not intended for use in patients <25years of age. eGFR results are calculated without a race factor using  the 2021 CKD-EPI equation. Careful clinical correlation is recommended, particularly when comparing to results calculated using previous equations. The CKD-EPI equation is less accurate in patients with extremes of muscle mass, extra-renal metabolism of creatinine, excessive creatine ingestion, or following therapy that affects renal tubular secretion.       Calcium 11/07/2022 9.6  8.3 - 10.4 MG/DL Final    WBC 11/07/2022 12.3 (A)  4.3 - 11.1 K/uL Final    RBC 11/07/2022 5.39 (A)  4.05 - 5.2 M/uL Final    Hemoglobin 11/07/2022 14.7  11.7 - 15.4 g/dL Final    Hematocrit 11/07/2022 47.5 (A)  35.8 - 46.3 % Final    MCV 11/07/2022 88.1  82 - 102 FL Final    MCH 11/07/2022 27.3  26.1 - 32.9 PG Final    MCHC 11/07/2022 30.9 (A)  31.4 - 35.0 g/dL Final    RDW 11/07/2022 15.0 (A)  11.9 - 14.6 % Final    Platelets 20/96/3496 270  150 - 450 K/uL Final    MPV 11/07/2022 12.2  9.4 - 12.3 FL Final    nRBC 11/07/2022 0.00  0.0 - 0.2 K/uL Final    **Note: Absolute NRBC parameter is now reported with Hemogram**    Hemoglobin A1C 11/07/2022 6.5 (A)  4.8 - 5.6 % Final    eAG 11/07/2022 140  mg/dL Final    Comment: Reference Range  Normal: 4.8-5.6  Diabetic >=6.5%  Normal       <5.7%          Past Medical History:   Diagnosis Date    Allergic rhinitis, cause unspecified     Arthritis     \"everywhere\"    Cancer ([facility] [address]     thyroid- dx 1988 Chronic pain     knees    Depressive disorder, not elsewhere classified     Endometrial cancer ([facility] [address]     dx 10/3/16    Impaired fasting glucose     Morbid obesity (HCC)     BMI- 37 10/19/16    Nausea & vomiting     POV but \"The last surgery was the best\"- D&C 9/28/16    Other malaise and fatigue     Postsurgical hypothyroidism     Tension headache     Throat pain     Type II or unspecified type diabetes mellitus without mention of complication, not stated as uncontrolled     checks fastings almost daily-avg 108- today (10/19/16)= 90- 5 hrs pc; last A1C= 6.0 in Sept 2016        Family History   Problem Relation Age of Onset    No Known Problems Sister     No Known Problems Brother     No Known Problems Brother     No Known Problems Brother     No Known Problems Brother     No Known Problems Brother     No Known Problems Brother     No Known Problems Brother     Migraines Father     Heart Disease Father         MI    Cancer Brother         skin    Hypertension Mother     Stroke Mother         TIA    No Known Problems Sister     No Known Problems Sister         Social History     Socioeconomic History    Marital status:      Spouse name: Not on file    Number of children: Not on file    Years of education: Not on file    Highest education level: Not on file   Occupational History    Not on file   Tobacco Use    Smoking status: Never    Smokeless tobacco: Never   Substance and Sexual Activity    Alcohol use: No    Drug use: No    Sexual activity: Not on file   Other Topics Concern    Not on file   Social History Narrative    Not on file     Social Determinants of Health     Financial Resource Strain: Not on file   Food Insecurity: Not on file   Transportation Needs: Not on file   Physical Activity: Not on file   Stress: Not on file   Social Connections: Not on file   Intimate Partner Violence: Not on file   Housing Stability: Not on file         Current Outpatient Medications:     levothyroxine (SYNTHROID) 175 MCG tablet, Take 1 tablet by mouth every morning (before breakfast), Disp: 30 tablet, Rfl: 11    sertraline (ZOLOFT) 50 MG tablet, Take 1 tablet by mouth daily, Disp: 30 tablet, Rfl: 11    nystatin (MYCOSTATIN) 225640 UNIT/GM cream, Apply topically 2 times daily, Disp: 15 g, Rfl: 5    meclizine (ANTIVERT) 25 MG tablet, Take 1 tablet by mouth 3 times daily as needed for Dizziness, Disp: 30 tablet, Rfl: 1    calcium carbonate (OYSTER SHELL CALCIUM 500 MG) 1250 (500 Ca) MG tablet, Take 1,000 mg by mouth, Disp: , Rfl:     Cholecalciferol 1.25 MG (22369 UT) TABS, Take by mouth, Disp: , Rfl:     Magnesium Oxide (MAG-CAPS PO), Take 1,500 mg by mouth daily, Disp: , Rfl:     Turmeric 500 MG TABS, Take 500 mg by mouth daily, Disp: , Rfl:     albuterol sulfate  (90 Base) MCG/ACT inhaler, Inhale 1 puff into the lungs every 4 hours as needed, Disp: , Rfl:     albuterol (PROVENTIL) (2.5 MG/3ML) 0.083% nebulizer solution, Inhale 2.5 mg into the lungs every 6 hours as needed, Disp: , Rfl:     benzonatate (TESSALON) 100 MG capsule, 1-2 up to tid prn, Disp: , Rfl:     cetirizine (ZYRTEC) 10 MG tablet, Take 10 mg by mouth daily, Disp: , Rfl:     fluticasone (FLONASE) 50 MCG/ACT nasal spray, 2 sprays by Nasal route daily, Disp: , Rfl:     montelukast (SINGULAIR) 10 MG tablet, Take 10 mg by mouth, Disp: , Rfl:     metFORMIN (GLUCOPHAGE) 500 MG tablet, Take 500 mg by mouth daily, Disp: , Rfl:     Allergies   Allergen Reactions    Latex Other (See Comments)     Rips skin off         Review of Systems   Constitutional:  Negative for appetite change, chills, fatigue and fever. HENT:  Negative for congestion and sinus pain. Respiratory:  Negative for shortness of breath and wheezing. Cardiovascular:  Negative for chest pain. Gastrointestinal:  Negative for abdominal pain, constipation, diarrhea, nausea and vomiting. Genitourinary:  Negative for dysuria and frequency.    Musculoskeletal:  Positive for arthralgias and myalgias. Neurological:  Negative for dizziness. Psychiatric/Behavioral:  Negative for suicidal ideas. All other systems reviewed and are negative. Vitals:    11/14/22 1349   BP: 132/88   Pulse: 78   SpO2: 98%   Weight: 239 lb (108.4 kg)           Physical Exam  Constitutional:       Appearance: She is obese. She is not ill-appearing. HENT:      Head: Normocephalic. Right Ear: Tympanic membrane normal.      Left Ear: Tympanic membrane normal.      Nose: Nose normal.      Mouth/Throat:      Mouth: Mucous membranes are moist.      Pharynx: Oropharynx is clear. Eyes:      Extraocular Movements: Extraocular movements intact. Conjunctiva/sclera: Conjunctivae normal.   Cardiovascular:      Rate and Rhythm: Normal rate and regular rhythm. Heart sounds: Normal heart sounds. Pulmonary:      Effort: Pulmonary effort is normal.      Breath sounds: Normal breath sounds. Abdominal:      General: Abdomen is flat. Bowel sounds are normal.      Palpations: Abdomen is soft. Musculoskeletal:      Cervical back: Normal range of motion and neck supple. Right lower leg: No edema. Left lower leg: No edema. Skin:     General: Skin is warm. Coloration: Skin is not jaundiced. Neurological:      General: No focal deficit present. Mental Status: She is alert. Mental status is at baseline. Psychiatric:         Mood and Affect: Mood normal.         Thought Content: Thought content normal.          Linnette Alvarez was seen today for follow-up. Diagnoses and all orders for this visit:    Leukocytosis, unspecified type  -     CBC with Auto Differential; Future    Encounter for screening mammogram for malignant neoplasm of breast  -     ZEYNEP DIGITAL SCREEN W OR WO CAD BILATERAL; Future  -     CBC; Future  -     Comprehensive Metabolic Panel; Future  -     Hemoglobin A1C; Future  -     TSH; Future  -     Lipid Panel;  Future    Type 2 diabetes mellitus without complication, without long-term current use of insulin (HCC)  -     CBC; Future  -     Comprehensive Metabolic Panel; Future  -     Hemoglobin A1C; Future  -     TSH; Future  -     Lipid Panel; Future  -     Ferritin; Future  -     Transferrin Saturation; Future  -     IRAM and PE, Serum; Future    Acquired hypothyroidism  -     levothyroxine (SYNTHROID) 175 MCG tablet; Take 1 tablet by mouth every morning (before breakfast)  -     CBC; Future  -     Comprehensive Metabolic Panel; Future  -     Hemoglobin A1C; Future  -     TSH; Future  -     Lipid Panel; Future    Mixed hyperlipidemia    Primary hypertension    Depression, unspecified depression type  -     sertraline (ZOLOFT) 50 MG tablet; Take 1 tablet by mouth daily    Other orders  -     nystatin (MYCOSTATIN) 449338 UNIT/GM cream; Apply topically 2 times daily  -     meclizine (ANTIVERT) 25 MG tablet;  Take 1 tablet by mouth 3 times daily as needed for Dizziness               Carlos Enrique Lira DO

## 2023-05-02 ENCOUNTER — OFFICE VISIT (OUTPATIENT)
Dept: INTERNAL MEDICINE CLINIC | Facility: CLINIC | Age: 63
End: 2023-05-02

## 2023-05-02 VITALS
HEIGHT: 67 IN | OXYGEN SATURATION: 94 % | BODY MASS INDEX: 35.16 KG/M2 | WEIGHT: 224 LBS | TEMPERATURE: 98 F | SYSTOLIC BLOOD PRESSURE: 112 MMHG | HEART RATE: 65 BPM | DIASTOLIC BLOOD PRESSURE: 78 MMHG

## 2023-05-02 DIAGNOSIS — J40 BRONCHITIS, NOT SPECIFIED AS ACUTE OR CHRONIC: Primary | ICD-10-CM

## 2023-05-02 PROCEDURE — 99213 OFFICE O/P EST LOW 20 MIN: CPT | Performed by: INTERNAL MEDICINE

## 2023-05-02 RX ORDER — BENZONATATE 100 MG/1
100-200 CAPSULE ORAL 3 TIMES DAILY PRN
Qty: 60 CAPSULE | Refills: 0 | Status: SHIPPED | OUTPATIENT
Start: 2023-05-02 | End: 2023-05-09

## 2023-05-02 RX ORDER — MONTELUKAST SODIUM 10 MG/1
10 TABLET ORAL NIGHTLY
Qty: 90 TABLET | Refills: 3 | Status: SHIPPED | OUTPATIENT
Start: 2023-05-02

## 2023-05-02 RX ORDER — AZITHROMYCIN 250 MG/1
250 TABLET, FILM COATED ORAL SEE ADMIN INSTRUCTIONS
Qty: 6 TABLET | Refills: 0 | Status: SHIPPED | OUTPATIENT
Start: 2023-05-02 | End: 2023-05-02 | Stop reason: SINTOL

## 2023-05-02 RX ORDER — MONTELUKAST SODIUM 10 MG/1
10 TABLET ORAL NIGHTLY
Qty: 90 TABLET | Refills: 3 | Status: SHIPPED | OUTPATIENT
Start: 2023-05-02 | End: 2023-05-02 | Stop reason: SINTOL

## 2023-05-02 RX ORDER — ALBUTEROL SULFATE 2.5 MG/3ML
2.5 SOLUTION RESPIRATORY (INHALATION) EVERY 4 HOURS PRN
Qty: 120 EACH | Refills: 5 | Status: SHIPPED | OUTPATIENT
Start: 2023-05-02

## 2023-05-02 RX ORDER — AZITHROMYCIN 250 MG/1
250 TABLET, FILM COATED ORAL SEE ADMIN INSTRUCTIONS
Qty: 6 TABLET | Refills: 0 | Status: SHIPPED | OUTPATIENT
Start: 2023-05-02 | End: 2023-05-07

## 2023-05-02 RX ORDER — PREDNISONE 20 MG/1
20 TABLET ORAL 2 TIMES DAILY
Qty: 14 TABLET | Refills: 0 | Status: SHIPPED | OUTPATIENT
Start: 2023-05-02 | End: 2023-05-09

## 2023-05-02 SDOH — ECONOMIC STABILITY: FOOD INSECURITY: WITHIN THE PAST 12 MONTHS, THE FOOD YOU BOUGHT JUST DIDN'T LAST AND YOU DIDN'T HAVE MONEY TO GET MORE.: NEVER TRUE

## 2023-05-02 SDOH — ECONOMIC STABILITY: HOUSING INSECURITY
IN THE LAST 12 MONTHS, WAS THERE A TIME WHEN YOU DID NOT HAVE A STEADY PLACE TO SLEEP OR SLEPT IN A SHELTER (INCLUDING NOW)?: NO

## 2023-05-02 SDOH — ECONOMIC STABILITY: INCOME INSECURITY: HOW HARD IS IT FOR YOU TO PAY FOR THE VERY BASICS LIKE FOOD, HOUSING, MEDICAL CARE, AND HEATING?: NOT HARD AT ALL

## 2023-05-02 SDOH — ECONOMIC STABILITY: FOOD INSECURITY: WITHIN THE PAST 12 MONTHS, YOU WORRIED THAT YOUR FOOD WOULD RUN OUT BEFORE YOU GOT MONEY TO BUY MORE.: NEVER TRUE

## 2023-05-02 ASSESSMENT — PATIENT HEALTH QUESTIONNAIRE - PHQ9
SUM OF ALL RESPONSES TO PHQ9 QUESTIONS 1 & 2: 0
9. THOUGHTS THAT YOU WOULD BE BETTER OFF DEAD, OR OF HURTING YOURSELF: 0
8. MOVING OR SPEAKING SO SLOWLY THAT OTHER PEOPLE COULD HAVE NOTICED. OR THE OPPOSITE, BEING SO FIGETY OR RESTLESS THAT YOU HAVE BEEN MOVING AROUND A LOT MORE THAN USUAL: 0
7. TROUBLE CONCENTRATING ON THINGS, SUCH AS READING THE NEWSPAPER OR WATCHING TELEVISION: 0
1. LITTLE INTEREST OR PLEASURE IN DOING THINGS: 0
5. POOR APPETITE OR OVEREATING: 0
2. FEELING DOWN, DEPRESSED OR HOPELESS: 0
SUM OF ALL RESPONSES TO PHQ QUESTIONS 1-9: 0
3. TROUBLE FALLING OR STAYING ASLEEP: 0
SUM OF ALL RESPONSES TO PHQ QUESTIONS 1-9: 0
SUM OF ALL RESPONSES TO PHQ QUESTIONS 1-9: 0
6. FEELING BAD ABOUT YOURSELF - OR THAT YOU ARE A FAILURE OR HAVE LET YOURSELF OR YOUR FAMILY DOWN: 0
4. FEELING TIRED OR HAVING LITTLE ENERGY: 0
SUM OF ALL RESPONSES TO PHQ QUESTIONS 1-9: 0

## 2023-05-02 ASSESSMENT — ENCOUNTER SYMPTOMS
DIARRHEA: 0
SHORTNESS OF BREATH: 0
ABDOMINAL PAIN: 0
COUGH: 1
NAUSEA: 0
VOMITING: 0
WHEEZING: 1
CONSTIPATION: 0
SINUS PAIN: 0

## 2023-05-02 NOTE — PROGRESS NOTES
Ulysses Grow was seen today for cough and medication refill. Diagnoses and all orders for this visit:    Bronchitis, not specified as acute or chronic  -     benzonatate (TESSALON) 100 MG capsule; Take 1-2 capsules by mouth 3 times daily as needed for Cough  -     albuterol (PROVENTIL) (2.5 MG/3ML) 0.083% nebulizer solution; Take 3 mLs by nebulization every 4 hours as needed for Wheezing or Shortness of Breath  -     montelukast (SINGULAIR) 10 MG tablet; Take 1 tablet by mouth nightly  -     predniSONE (DELTASONE) 20 MG tablet; Take 1 tablet by mouth 2 times daily for 7 days    Other orders  -     Discontinue: montelukast (SINGULAIR) 10 MG tablet; Take 1 tablet by mouth nightly  -     Discontinue: azithromycin (ZITHROMAX) 250 MG tablet; Take 1 tablet by mouth See Admin Instructions for 5 days 500mg on day 1 followed by 250mg on days 2 - 5  -     azithromycin (ZITHROMAX) 250 MG tablet; Take 1 tablet by mouth See Admin Instructions for 5 days 500mg on day 1 followed by 250mg on days 2 - 5          Manuel Hilario is a 65 y.o. female    Chief Complaint   Patient presents with    Cough     CONGESTION    Medication Refill     Tessalon   Did not test covid  Has been sick x 3 days  after started sore throat      Nurse Only on 11/07/2022   Component Date Value Ref Range Status    Sodium 11/07/2022 137  133 - 143 mmol/L Final    Potassium 11/07/2022 4.3  3.5 - 5.1 mmol/L Final    Chloride 11/07/2022 103  101 - 110 mmol/L Final    CO2 11/07/2022 32  21 - 32 mmol/L Final    Anion Gap 11/07/2022 2  2 - 11 mmol/L Final    Glucose 11/07/2022 147 (H)  65 - 100 mg/dL Final    BUN 11/07/2022 14  8 - 23 MG/DL Final    Creatinine 11/07/2022 0.80  0.6 - 1.0 MG/DL Final    Est, Glom Filt Rate 11/07/2022 >60  >60 ml/min/1.73m2 Final    Comment:   Pediatric calculator link: [email or web address] org/professionals/kdoqi/gfr_calculatorped    Effective Oct 3, 2022    These results are not intended for use in patients <25years of age.     eGFR

## 2023-09-11 ENCOUNTER — OFFICE VISIT (OUTPATIENT)
Dept: INTERNAL MEDICINE CLINIC | Facility: CLINIC | Age: 63
End: 2023-09-11

## 2023-09-11 VITALS
DIASTOLIC BLOOD PRESSURE: 88 MMHG | HEART RATE: 74 BPM | HEIGHT: 67 IN | RESPIRATION RATE: 17 BRPM | WEIGHT: 236 LBS | SYSTOLIC BLOOD PRESSURE: 140 MMHG | BODY MASS INDEX: 37.04 KG/M2 | OXYGEN SATURATION: 98 %

## 2023-09-11 DIAGNOSIS — M25.562 ACUTE PAIN OF LEFT KNEE: Primary | ICD-10-CM

## 2023-09-11 PROCEDURE — 20610 DRAIN/INJ JOINT/BURSA W/O US: CPT | Performed by: INTERNAL MEDICINE

## 2023-09-11 PROCEDURE — 99212 OFFICE O/P EST SF 10 MIN: CPT | Performed by: INTERNAL MEDICINE

## 2023-09-11 RX ORDER — METHYLPREDNISOLONE ACETATE 40 MG/ML
80 INJECTION, SUSPENSION INTRA-ARTICULAR; INTRALESIONAL; INTRAMUSCULAR; SOFT TISSUE ONCE
Qty: 2 ML | Refills: 0
Start: 2023-09-11 | End: 2023-09-11

## 2023-09-11 NOTE — PROGRESS NOTES
Mary Harmon was seen today for knee pain. Diagnoses and all orders for this visit:    Acute pain of left knee  Comments:  suspect meniscal tear,try get pain reduced,watch  Orders:  -     methylPREDNISolone acetate (DEPO-MEDROL) 40 MG/ML injection; Inject 2 mLs into the articular space once for 1 dose  -     DRAIN/INJECT LARGE JOINT/BURSA      Chief Complaint:   Chief Complaint   Patient presents with    Knee Pain     Approx 3 days       Indications for Procedure: pain knee    Site: ant lat inferopatellar  area    Procedure:   Informed consent was obtained and signed by the patient. 1 cc of 1%Lidocaine without epinephrine was used. Site was prepped with alcohol and betadine. 80 mg depo    Patient tolerated the procedure well:   yes  Complications:  no  All questions answered:  yes  Patient instructions given regarding injection site:  yes    Merline Osler, DO Kenard Cancel is a 65 y.o. female    Chief Complaint   Patient presents with    Knee Pain     Approx 3 days   Feels left knee. No injury known. Just started hurting severe last 3 days. No previous similar episode. Nurse Only on 11/07/2022   Component Date Value Ref Range Status    Sodium 11/07/2022 137  133 - 143 mmol/L Final    Potassium 11/07/2022 4.3  3.5 - 5.1 mmol/L Final    Chloride 11/07/2022 103  101 - 110 mmol/L Final    CO2 11/07/2022 32  21 - 32 mmol/L Final    Anion Gap 11/07/2022 2  2 - 11 mmol/L Final    Glucose 11/07/2022 147 (H)  65 - 100 mg/dL Final    BUN 11/07/2022 14  8 - 23 MG/DL Final    Creatinine 11/07/2022 0.80  0.6 - 1.0 MG/DL Final    Est, Glom Filt Rate 11/07/2022 >60  >60 ml/min/1.73m2 Final    Comment:   Pediatric calculator link: [email or web address] org/professionals/kdoqi/gfr_calculatorped    Effective Oct 3, 2022    These results are not intended for use in patients <25years of age. eGFR results are calculated without a race factor using  the 2021 CKD-EPI equation.  Careful clinical correlation is recommended,

## 2023-09-13 ASSESSMENT — ENCOUNTER SYMPTOMS
SHORTNESS OF BREATH: 0
ABDOMINAL PAIN: 0
SINUS PAIN: 0
DIARRHEA: 0
NAUSEA: 0
VOMITING: 0
WHEEZING: 0
CONSTIPATION: 0

## 2023-11-10 ENCOUNTER — NURSE ONLY (OUTPATIENT)
Dept: INTERNAL MEDICINE CLINIC | Facility: CLINIC | Age: 63
End: 2023-11-10

## 2023-11-10 DIAGNOSIS — E11.9 TYPE 2 DIABETES MELLITUS WITHOUT COMPLICATION, WITHOUT LONG-TERM CURRENT USE OF INSULIN (HCC): ICD-10-CM

## 2023-11-10 DIAGNOSIS — E03.9 ACQUIRED HYPOTHYROIDISM: ICD-10-CM

## 2023-11-10 DIAGNOSIS — D72.829 LEUKOCYTOSIS, UNSPECIFIED TYPE: ICD-10-CM

## 2023-11-10 DIAGNOSIS — Z12.31 ENCOUNTER FOR SCREENING MAMMOGRAM FOR MALIGNANT NEOPLASM OF BREAST: ICD-10-CM

## 2023-11-10 LAB
BASOPHILS # BLD: 0.1 K/UL (ref 0–0.2)
BASOPHILS NFR BLD: 1 % (ref 0–2)
DIFFERENTIAL METHOD BLD: ABNORMAL
EOSINOPHIL # BLD: 0.3 K/UL (ref 0–0.8)
EOSINOPHIL NFR BLD: 2 % (ref 0.5–7.8)
ERYTHROCYTE [DISTWIDTH] IN BLOOD BY AUTOMATED COUNT: 14.6 % (ref 11.9–14.6)
HCT VFR BLD AUTO: 46.7 % (ref 35.8–46.3)
HGB BLD-MCNC: 14.4 G/DL (ref 11.7–15.4)
IMM GRANULOCYTES # BLD AUTO: 0 K/UL (ref 0–0.5)
IMM GRANULOCYTES NFR BLD AUTO: 0 % (ref 0–5)
LYMPHOCYTES # BLD: 6.3 K/UL (ref 0.5–4.6)
LYMPHOCYTES NFR BLD: 48 % (ref 13–44)
MCH RBC QN AUTO: 27 PG (ref 26.1–32.9)
MCHC RBC AUTO-ENTMCNC: 30.8 G/DL (ref 31.4–35)
MCV RBC AUTO: 87.6 FL (ref 82–102)
MONOCYTES # BLD: 0.4 K/UL (ref 0.1–1.3)
MONOCYTES NFR BLD: 3 % (ref 4–12)
NEUTS SEG # BLD: 6 K/UL (ref 1.7–8.2)
NEUTS SEG NFR BLD: 46 % (ref 43–78)
NRBC # BLD: 0 K/UL (ref 0–0.2)
PLATELET # BLD AUTO: 274 K/UL (ref 150–450)
PLATELET COMMENT: ADEQUATE
PMV BLD AUTO: 10.9 FL (ref 9.4–12.3)
RBC # BLD AUTO: 5.33 M/UL (ref 4.05–5.2)
RBC MORPH BLD: ABNORMAL
WBC # BLD AUTO: 13.1 K/UL (ref 4.3–11.1)
WBC MORPH BLD: ABNORMAL

## 2023-11-11 LAB
ALBUMIN SERPL-MCNC: 4 G/DL (ref 3.2–4.6)
ALBUMIN/GLOB SERPL: 1.3 (ref 0.4–1.6)
ALP SERPL-CCNC: 106 U/L (ref 50–136)
ALT SERPL-CCNC: 26 U/L (ref 12–65)
ANION GAP SERPL CALC-SCNC: 6 MMOL/L (ref 2–11)
AST SERPL-CCNC: 16 U/L (ref 15–37)
BILIRUB SERPL-MCNC: 0.4 MG/DL (ref 0.2–1.1)
BUN SERPL-MCNC: 15 MG/DL (ref 8–23)
CALCIUM SERPL-MCNC: 9.4 MG/DL (ref 8.3–10.4)
CHLORIDE SERPL-SCNC: 103 MMOL/L (ref 101–110)
CHOLEST SERPL-MCNC: 210 MG/DL
CO2 SERPL-SCNC: 28 MMOL/L (ref 21–32)
CREAT SERPL-MCNC: 0.8 MG/DL (ref 0.6–1)
FERRITIN SERPL-MCNC: 87 NG/ML (ref 8–388)
GLOBULIN SER CALC-MCNC: 3 G/DL (ref 2.8–4.5)
GLUCOSE SERPL-MCNC: 136 MG/DL (ref 65–100)
HDLC SERPL-MCNC: 55 MG/DL (ref 40–60)
HDLC SERPL: 3.8
IRON SATN MFR SERPL: 23 %
IRON SERPL-MCNC: 70 UG/DL (ref 35–150)
LDLC SERPL CALC-MCNC: 139.2 MG/DL
POTASSIUM SERPL-SCNC: 4.6 MMOL/L (ref 3.5–5.1)
PROT SERPL-MCNC: 7 G/DL (ref 6.3–8.2)
SODIUM SERPL-SCNC: 137 MMOL/L (ref 133–143)
TIBC SERPL-MCNC: 303 UG/DL (ref 250–450)
TRIGL SERPL-MCNC: 79 MG/DL (ref 35–150)
TSH, 3RD GENERATION: 10.6 UIU/ML (ref 0.36–3.74)
VLDLC SERPL CALC-MCNC: 15.8 MG/DL (ref 6–23)

## 2023-11-12 LAB
EST. AVERAGE GLUCOSE BLD GHB EST-MCNC: 137 MG/DL
HBA1C MFR BLD: 6.4 % (ref 4.8–5.6)

## 2023-11-14 LAB
ALBUMIN SERPL ELPH-MCNC: 3.9 G/DL (ref 2.9–4.4)
ALBUMIN/GLOB SERPL: 1.6 (ref 0.7–1.7)
ALPHA1 GLOB SERPL ELPH-MCNC: 0.2 G/DL (ref 0–0.4)
ALPHA2 GLOB SERPL ELPH-MCNC: 0.7 G/DL (ref 0.4–1)
B-GLOBULIN SERPL ELPH-MCNC: 1 G/DL (ref 0.7–1.3)
GAMMA GLOB SERPL ELPH-MCNC: 0.7 G/DL (ref 0.4–1.8)
GLOBULIN SER-MCNC: 2.6 G/DL (ref 2.2–3.9)
IGA SERPL-MCNC: 137 MG/DL (ref 87–352)
IGG SERPL-MCNC: 832 MG/DL (ref 586–1602)
IGM SERPL-MCNC: 102 MG/DL (ref 26–217)
INTERPRETATION SERPL IEP-IMP: ABNORMAL
M PROTEIN SERPL ELPH-MCNC: ABNORMAL G/DL
PROT SERPL-MCNC: 6.5 G/DL (ref 6–8.5)

## 2023-11-16 ENCOUNTER — OFFICE VISIT (OUTPATIENT)
Dept: INTERNAL MEDICINE CLINIC | Facility: CLINIC | Age: 63
End: 2023-11-16

## 2023-11-16 VITALS
HEART RATE: 70 BPM | HEIGHT: 67 IN | WEIGHT: 243 LBS | DIASTOLIC BLOOD PRESSURE: 80 MMHG | SYSTOLIC BLOOD PRESSURE: 120 MMHG | BODY MASS INDEX: 38.14 KG/M2 | OXYGEN SATURATION: 98 % | TEMPERATURE: 98.1 F

## 2023-11-16 DIAGNOSIS — D47.2 MONOCLONAL GAMMOPATHY OF UNKNOWN SIGNIFICANCE: ICD-10-CM

## 2023-11-16 DIAGNOSIS — F32.A DEPRESSION, UNSPECIFIED DEPRESSION TYPE: ICD-10-CM

## 2023-11-16 DIAGNOSIS — E03.9 ACQUIRED HYPOTHYROIDISM: ICD-10-CM

## 2023-11-16 DIAGNOSIS — Z12.31 SCREENING MAMMOGRAM FOR BREAST CANCER: ICD-10-CM

## 2023-11-16 DIAGNOSIS — E11.9 TYPE 2 DIABETES MELLITUS WITHOUT COMPLICATION, WITHOUT LONG-TERM CURRENT USE OF INSULIN (HCC): Primary | ICD-10-CM

## 2023-11-16 PROCEDURE — 3044F HG A1C LEVEL LT 7.0%: CPT | Performed by: INTERNAL MEDICINE

## 2023-11-16 PROCEDURE — 99213 OFFICE O/P EST LOW 20 MIN: CPT | Performed by: INTERNAL MEDICINE

## 2023-11-16 RX ORDER — LEVOTHYROXINE SODIUM 0.2 MG/1
200 TABLET ORAL
Qty: 90 TABLET | Refills: 1 | Status: SHIPPED | OUTPATIENT
Start: 2023-11-16

## 2023-11-16 RX ORDER — NYSTATIN 100000 U/G
CREAM TOPICAL 2 TIMES DAILY
Qty: 15 G | Refills: 5 | Status: SHIPPED | OUTPATIENT
Start: 2023-11-16

## 2023-11-16 ASSESSMENT — ENCOUNTER SYMPTOMS
VOMITING: 0
WHEEZING: 0
SINUS PAIN: 0
SHORTNESS OF BREATH: 0
ABDOMINAL PAIN: 0
DIARRHEA: 0
CONSTIPATION: 0
NAUSEA: 0

## 2023-11-16 NOTE — PROGRESS NOTES
Treasure Yoon was seen today for follow-up, medication refill and wheezing. Diagnoses and all orders for this visit:    Type 2 diabetes mellitus without complication, without long-term current use of insulin ([address])  -     [facility] Coordination/Social Work - [facility] Care Management    Screening mammogram for breast cancer  -     ZEYNEP DIGITAL SCREEN W OR WO CAD BILATERAL; Future  -     [address] - Care Coordination/Social Work - [facility] [address] Management    Acquired hypothyroidism  -     levothyroxine (SYNTHROID) 200 MCG tablet; Take 1 tablet by mouth every morning (before breakfast)  -     TSH; Future    Depression, unspecified depression type  -     sertraline (ZOLOFT) 50 MG tablet;  Take 1 tablet by mouth daily    Monoclonal gammopathy of unknown significance  -     nystatin (MYCOSTATIN) 955464 UNIT/GM cream; Apply topically 2 times daily  -     [facility] Hematology & Oncology          Zacarias Lugo is a 59 y.o. female    Chief Complaint   Patient presents with    Follow-up     Yearly check up and review labs    Medication Refill    Wheezing   Dm-much improved  Lab Results   Component Value Date    LABA1C 6.4 (H) 11/10/2023    LABA1C 6.5 (H) 11/07/2022    LABA1C 6.3 (H) 04/28/2022     Lab Results   Component Value Date    LDLCALC 139.2 (H) 11/10/2023    CREATININE 0.80 11/10/2023      Cbc--lymphocytes elevated  Spep-faint monoclonal         Nurse Only on 11/10/2023   Component Date Value Ref Range Status    IgG, Serum 11/10/2023 832  586 - 1,602 mg/dL Final    IgA 11/10/2023 137  87 - 352 mg/dL Final    IgM 11/10/2023 102  26 - 217 mg/dL Final    Total Protein 11/10/2023 6.5  6.0 - 8.5 g/dL Final    Albumin 11/10/2023 3.9  2.9 - 4.4 g/dL Final    Alpha 1 Globulin 11/10/2023 0.2  0.0 - 0.4 g/dL Final    Alpha 2 Globulin 11/10/2023 0.7  0.4 - 1.0 g/dL Final    BETA GLOBULIN 11/10/2023 1.0  0.7 - 1.3 g/dL Final    GAMMA GLOBULIN 11/10/2023 0.7  0.4 - 1.8 g/dL Final    M-Kade 11/10/2023 Not Observed  Not Observed g/dL

## 2024-01-24 ENCOUNTER — HOSPITAL ENCOUNTER (OUTPATIENT)
Dept: MAMMOGRAPHY | Age: 64
Discharge: HOME | End: 2024-01-27
Attending: INTERNAL MEDICINE

## 2024-01-24 DIAGNOSIS — Z12.31 SCREENING MAMMOGRAM FOR BREAST CANCER: ICD-10-CM

## 2024-01-24 PROCEDURE — 77067 SCR MAMMO BI INCL CAD: CPT

## 2024-03-12 ENCOUNTER — NURSE ONLY (OUTPATIENT)
Dept: INTERNAL MEDICINE CLINIC | Facility: CLINIC | Age: 64
End: 2024-03-12

## 2024-03-12 DIAGNOSIS — E03.9 ACQUIRED HYPOTHYROIDISM: ICD-10-CM

## 2024-03-12 LAB — TSH, 3RD GENERATION: 2.34 UIU/ML (ref 0.36–3.74)

## 2024-03-18 ENCOUNTER — TELEPHONE (OUTPATIENT)
Dept: INTERNAL MEDICINE CLINIC | Facility: CLINIC | Age: 64
End: 2024-03-18

## 2024-03-18 DIAGNOSIS — E03.9 ACQUIRED HYPOTHYROIDISM: ICD-10-CM

## 2024-03-18 RX ORDER — LEVOTHYROXINE SODIUM 0.2 MG/1
200 TABLET ORAL
Qty: 90 TABLET | Refills: 2 | Status: SHIPPED | OUTPATIENT
Start: 2024-03-18

## 2024-06-12 DIAGNOSIS — D72.820 LYMPHOCYTOSIS: ICD-10-CM

## 2024-06-12 DIAGNOSIS — D47.2 MGUS (MONOCLONAL GAMMOPATHY OF UNKNOWN SIGNIFICANCE): Primary | ICD-10-CM

## 2024-06-28 DIAGNOSIS — D72.820 LYMPHOCYTOSIS: ICD-10-CM

## 2024-06-28 DIAGNOSIS — D47.2 MGUS (MONOCLONAL GAMMOPATHY OF UNKNOWN SIGNIFICANCE): Primary | ICD-10-CM

## 2024-07-01 ENCOUNTER — OFFICE VISIT (OUTPATIENT)
Dept: ONCOLOGY | Age: 64
End: 2024-07-01

## 2024-07-01 ENCOUNTER — HOSPITAL ENCOUNTER (OUTPATIENT)
Dept: LAB | Age: 64
Discharge: HOME | End: 2024-07-04

## 2024-07-01 VITALS
DIASTOLIC BLOOD PRESSURE: 68 MMHG | WEIGHT: 241.5 LBS | TEMPERATURE: 98.2 F | BODY MASS INDEX: 37.9 KG/M2 | HEIGHT: 67 IN | RESPIRATION RATE: 18 BRPM | SYSTOLIC BLOOD PRESSURE: 144 MMHG | HEART RATE: 68 BPM | OXYGEN SATURATION: 96 %

## 2024-07-01 DIAGNOSIS — E11.9 TYPE 2 DIABETES MELLITUS WITHOUT COMPLICATION, WITHOUT LONG-TERM CURRENT USE OF INSULIN (HCC): ICD-10-CM

## 2024-07-01 DIAGNOSIS — D72.820 LYMPHOCYTOSIS: ICD-10-CM

## 2024-07-01 DIAGNOSIS — D47.2 MGUS (MONOCLONAL GAMMOPATHY OF UNKNOWN SIGNIFICANCE): Primary | ICD-10-CM

## 2024-07-01 DIAGNOSIS — E03.9 HYPOTHYROIDISM, UNSPECIFIED TYPE: ICD-10-CM

## 2024-07-01 DIAGNOSIS — E11.9 TYPE 2 DIABETES MELLITUS WITHOUT COMPLICATION, WITHOUT LONG-TERM CURRENT USE OF INSULIN (HCC): Primary | ICD-10-CM

## 2024-07-01 DIAGNOSIS — D47.2 MGUS (MONOCLONAL GAMMOPATHY OF UNKNOWN SIGNIFICANCE): ICD-10-CM

## 2024-07-01 DIAGNOSIS — E78.2 MIXED HYPERLIPIDEMIA: ICD-10-CM

## 2024-07-01 LAB
ALBUMIN SERPL-MCNC: 3.8 G/DL (ref 3.2–4.6)
ALBUMIN/GLOB SERPL: 1.3 (ref 1–1.9)
ALP SERPL-CCNC: 111 U/L (ref 35–104)
ALT SERPL-CCNC: 19 U/L (ref 12–65)
ANION GAP SERPL CALC-SCNC: 12 MMOL/L (ref 9–18)
AST SERPL-CCNC: 17 U/L (ref 15–37)
BASOPHILS # BLD: 0.2 K/UL (ref 0–0.2)
BASOPHILS NFR BLD: 1 % (ref 0–2)
BILIRUB SERPL-MCNC: 0.2 MG/DL (ref 0–1.2)
BUN SERPL-MCNC: 16 MG/DL (ref 8–23)
CALCIUM SERPL-MCNC: 9.7 MG/DL (ref 8.8–10.2)
CHLORIDE SERPL-SCNC: 96 MMOL/L (ref 98–107)
CHOLEST SERPL-MCNC: 201 MG/DL (ref 0–200)
CO2 SERPL-SCNC: 27 MMOL/L (ref 20–28)
CREAT SERPL-MCNC: 0.75 MG/DL (ref 0.6–1.1)
DIFFERENTIAL METHOD BLD: ABNORMAL
EOSINOPHIL # BLD: 0.3 K/UL (ref 0–0.8)
EOSINOPHIL NFR BLD: 2 % (ref 0.5–7.8)
ERYTHROCYTE [DISTWIDTH] IN BLOOD BY AUTOMATED COUNT: 14.6 % (ref 11.9–14.6)
EST. AVERAGE GLUCOSE BLD GHB EST-MCNC: 166 MG/DL
GLOBULIN SER CALC-MCNC: 3 G/DL (ref 2.3–3.5)
GLUCOSE SERPL-MCNC: 182 MG/DL (ref 70–99)
HBA1C MFR BLD: 7.4 % (ref 0–5.6)
HCT VFR BLD AUTO: 43.8 % (ref 35.8–46.3)
HDLC SERPL-MCNC: 54 MG/DL (ref 40–60)
HDLC SERPL: 3.7 (ref 0–5)
HGB BLD-MCNC: 14.3 G/DL (ref 11.7–15.4)
IMM GRANULOCYTES # BLD AUTO: 0 K/UL (ref 0–0.5)
IMM GRANULOCYTES NFR BLD AUTO: 0 % (ref 0–5)
KAPPA LC FREE SER-MCNC: 18.5 MG/L (ref 2.4–20.7)
KAPPA LC FREE/LAMBDA FREE SER: 1.4 (ref 0.2–0.8)
LAMBDA LC FREE SERPL-MCNC: 13.5 MG/L (ref 4.2–27.7)
LDLC SERPL CALC-MCNC: 121 MG/DL (ref 0–100)
LYMPHOCYTES # BLD: 7.3 K/UL (ref 0.5–4.6)
LYMPHOCYTES NFR BLD: 44 % (ref 13–44)
MCH RBC QN AUTO: 27.1 PG (ref 26.1–32.9)
MCHC RBC AUTO-ENTMCNC: 32.6 G/DL (ref 31.4–35)
MCV RBC AUTO: 83.1 FL (ref 82–102)
MONOCYTES # BLD: 0.7 K/UL (ref 0.1–1.3)
MONOCYTES NFR BLD: 4 % (ref 4–12)
NEUTS SEG # BLD: 8.2 K/UL (ref 1.7–8.2)
NEUTS SEG NFR BLD: 49 % (ref 43–78)
NRBC # BLD: 0 K/UL (ref 0–0.2)
PLATELET # BLD AUTO: 261 K/UL (ref 150–450)
PLATELET COMMENT: ADEQUATE
PMV BLD AUTO: 10.7 FL (ref 9.4–12.3)
POTASSIUM SERPL-SCNC: 4.4 MMOL/L (ref 3.5–5.1)
PROT SERPL-MCNC: 6.8 G/DL (ref 6.3–8.2)
RBC # BLD AUTO: 5.27 M/UL (ref 4.05–5.2)
RBC MORPH BLD: ABNORMAL
SODIUM SERPL-SCNC: 135 MMOL/L (ref 136–145)
TRIGL SERPL-MCNC: 128 MG/DL (ref 0–150)
VLDLC SERPL CALC-MCNC: 26 MG/DL (ref 6–23)
WBC # BLD AUTO: 16.7 K/UL (ref 4.3–11.1)
WBC MORPH BLD: ABNORMAL

## 2024-07-01 PROCEDURE — 99215 OFFICE O/P EST HI 40 MIN: CPT | Performed by: INTERNAL MEDICINE

## 2024-07-01 PROCEDURE — 80053 COMPREHEN METABOLIC PANEL: CPT

## 2024-07-01 PROCEDURE — 80061 LIPID PANEL: CPT

## 2024-07-01 PROCEDURE — 36415 COLL VENOUS BLD VENIPUNCTURE: CPT

## 2024-07-01 PROCEDURE — 83036 HEMOGLOBIN GLYCOSYLATED A1C: CPT

## 2024-07-01 PROCEDURE — 83521 IG LIGHT CHAINS FREE EACH: CPT

## 2024-07-01 PROCEDURE — 84165 PROTEIN E-PHORESIS SERUM: CPT

## 2024-07-01 PROCEDURE — 86334 IMMUNOFIX E-PHORESIS SERUM: CPT

## 2024-07-01 PROCEDURE — 85025 COMPLETE CBC W/AUTO DIFF WBC: CPT

## 2024-07-01 PROCEDURE — 82784 ASSAY IGA/IGD/IGG/IGM EACH: CPT

## 2024-07-01 RX ORDER — ASCORBIC ACID 500 MG
500 TABLET ORAL DAILY
COMMUNITY

## 2024-07-01 NOTE — PROGRESS NOTES
[place] [id number]  Phone: [phone]           7/1/2024  Annamaria Crabtree  1960  [id number]        Annamaria Crabtree is a 63 year old  female who has returned to my clinic for a follow-up visit; she was initially referred to me by Dr. Delvin Wick. In 11/2023 she was diagnosed with MGUS, IgM Kappa ( low-risk group ).        ALLERGIES:   Latex.        FAMILY HISTORY:   No hematologic disorders.        SOCIAL HISTORY:    She is  and lives with her . She works as a . She denies ever using any tobacco products.        PAST MEDICAL HISTORY:    Allergic Rhinitis, Depression, Thyroid Cancer, iatrogenic Hypothyroidism, Osteoarthritis, Asthma, MGUS and Diabetes Mellitus.        ROS:  The patient complained of fatigue; all other systems negative.        PHYSICAL EXAM:   The patient was alert, awake and oriented, no acute distress was noted. Oral examination did not reveal any mucosal lesions. Lymph node examination did not reveal any adenopathy. Neck examination revealed a supple neck, no thyromegaly or masses were noted. Chest examination revealed normal vesicular breath sounds. Heart examination revealed S-1 and S-2 without any murmurs. Abdominal examination revealed a non-tender abdomen, bowel sounds were positive, no organomegaly could be appreciated. Examination of the extremities did not reveal any tenderness or erythema. Examination of the skin did not reveal any lesions.        KPS:   100.        LABORATORY INVESTIGATIONS:  CBC showed a WBC count of 16.7, ANC was 8.2, ALC was 7.3, Hemoglobin was 14.3 and Platelets were 261. Medical problems and test results were reviewed with the patient today.        ASSESSMENT:    MGUS, IgM Kappa; Lymphocytosis; Hypothyroidism. I spent a total of 42 minutes on the day of the visit, managing the care of this patient.        PLAN:   She should continue taking Synthroid; at her next clinic visit

## 2024-07-01 NOTE — PATIENT INSTRUCTIONS
Patient Information from Today's Visit    The members of your [facility] are listed below:    Physician Provider: Pastor Vitale Medical Oncologist  Advanced Practice Clinician: Una Berg NP  Registered Nurse: N/A  Navigator: N/A  Medical Assistant: Aurea POWELL MA and Megan ROBLES MA  : Steph POWELL   Supportive Care Services: Kendrick RINCON LMSW    Diagnosis:   MGUS      Follow Up Instructions:   Lab work looks stable today  We will bring you back in March 2025 for a follow up with  and lab work prior.      Treatment Summary has been discussed and given to patient:N/A      Current Labs:   Hospital Outpatient Visit on 07/01/2024   Component Date Value Ref Range Status    KAPPA FREE LIGHT CHAIN 07/01/2024 18.5  2.4 - 20.7 mg/L Final    LAMBDA FREE LIGHT CHAIN 07/01/2024 13.5  4.2 - 27.7 mg/L Final    Kappa/Lambda Fluid C Ratio 07/01/2024 1.4 (H)  0.2 - 0.8   Final    Sodium 07/01/2024 135 (L)  136 - 145 mmol/L Final    Potassium 07/01/2024 4.4  3.5 - 5.1 mmol/L Final    Chloride 07/01/2024 96 (L)  98 - 107 mmol/L Final    CO2 07/01/2024 27  20 - 28 mmol/L Final    Anion Gap 07/01/2024 12  9 - 18 mmol/L Final    Glucose 07/01/2024 182 (H)  70 - 99 mg/dL Final    Comment: <70 mg/dL Consistent with, but not fully diagnostic of hypoglycemia.  100 - 125 mg/dL Impaired fasting glucose/consistent with pre-diabetes mellitus.  > 126 mg/dl Fasting glucose consistent with overt diabetes mellitus      BUN 07/01/2024 16  8 - 23 MG/DL Final    Creatinine 07/01/2024 0.75  0.60 - 1.10 MG/DL Final    Est, Glom Filt Rate 07/01/2024 90  >60 ml/min/1.73m2 Final    Comment:    Pediatric calculator link: https://www.kidney.org/professionals/kdoqi/gfr_calculatorped     These results are not intended for use in patients <18 years of age.     eGFR results are calculated without a race factor using  the 2021 CKD-EPI equation. Careful clinical correlation is recommended, particularly when comparing to results calculated

## 2024-07-06 LAB
ALBUMIN SERPL ELPH-MCNC: 3.7 G/DL (ref 2.9–4.4)
ALBUMIN/GLOB SERPL: 1.3 (ref 0.7–1.7)
ALPHA1 GLOB SERPL ELPH-MCNC: 0.3 G/DL (ref 0–0.4)
ALPHA2 GLOB SERPL ELPH-MCNC: 0.8 G/DL (ref 0.4–1)
B-GLOBULIN SERPL ELPH-MCNC: 1 G/DL (ref 0.7–1.3)
GAMMA GLOB SERPL ELPH-MCNC: 0.9 G/DL (ref 0.4–1.8)
GLOBULIN SER-MCNC: 3 G/DL (ref 2.2–3.9)
IGA SERPL-MCNC: 135 MG/DL (ref 87–352)
IGG SERPL-MCNC: 882 MG/DL (ref 586–1602)
IGM SERPL-MCNC: 100 MG/DL (ref 26–217)
INTERPRETATION SERPL IEP-IMP: ABNORMAL
M PROTEIN SERPL ELPH-MCNC: ABNORMAL G/DL
PROT SERPL-MCNC: 6.7 G/DL (ref 6–8.5)

## 2024-07-10 LAB
FLOW CYTOMETRY RESULTS: NORMAL
SPECIMEN SOURCE: NORMAL
TEST ORDERED: NORMAL

## 2024-07-26 ENCOUNTER — OFFICE VISIT (OUTPATIENT)
Dept: SLEEP MEDICINE | Age: 64
End: 2024-07-26

## 2024-07-26 VITALS
SYSTOLIC BLOOD PRESSURE: 125 MMHG | WEIGHT: 235 LBS | HEIGHT: 67 IN | RESPIRATION RATE: 19 BRPM | TEMPERATURE: 97.4 F | DIASTOLIC BLOOD PRESSURE: 72 MMHG | HEART RATE: 67 BPM | BODY MASS INDEX: 36.88 KG/M2 | OXYGEN SATURATION: 95 %

## 2024-07-26 DIAGNOSIS — G47.00 PERSISTENT DISORDER OF INITIATING OR MAINTAINING SLEEP: ICD-10-CM

## 2024-07-26 DIAGNOSIS — E66.9 OBESITY (BMI 35.0-39.9 WITHOUT COMORBIDITY): ICD-10-CM

## 2024-07-26 DIAGNOSIS — R06.83 SNORING: ICD-10-CM

## 2024-07-26 DIAGNOSIS — G25.81 RLS (RESTLESS LEGS SYNDROME): ICD-10-CM

## 2024-07-26 DIAGNOSIS — G47.8 NON-RESTORATIVE SLEEP: ICD-10-CM

## 2024-07-26 DIAGNOSIS — G47.10 HYPERSOMNIA: ICD-10-CM

## 2024-07-26 DIAGNOSIS — F45.8 BRUXISM (TEETH GRINDING): ICD-10-CM

## 2024-07-26 DIAGNOSIS — R29.818 SUSPECTED SLEEP APNEA: Primary | ICD-10-CM

## 2024-07-26 PROCEDURE — 99203 OFFICE O/P NEW LOW 30 MIN: CPT | Performed by: NURSE PRACTITIONER

## 2024-07-26 NOTE — PROGRESS NOTES
[facility]  [address]  [place] [address]  [phone]    Patient Name:  Annamaria Crabtree  YOB: 1960      Office Visit 7/26/2024    CHIEF COMPLAINT:    Chief Complaint   Patient presents with    New Patient       HISTORY OF PRESENT ILLNESS:      The patient presents in outpatient consultation at the request of Dr. Wick for suspected obstructive sleep apnea.  Significant PMH of arthritis, depression, tension headache, diabetes, and obesity.     She presents today with her  who is also being evaluated as well.  She states that their children have told them that they snore and like all kinds of different noises while they are sleeping.  States that they recently had a trip where they stayed in a hotel with their children and they all told them that they needed to be evaluated for sleep apnea.  She reports that she has known that she snores for many years and her mother does wear a CPAP.  She states that she is continually sleepy all the time and never feels well rested upon awakening in the morning.  Her normal bedtime hours are from 11 PM till 630 AM.  She states that she will get up 1-2 times during the night to use the bathroom but usually does not have a problem going to sleep.  She denies any alcohol use, tobacco use, or illicit drug use.  She does have a history of grinding her teeth but states that she could not tolerate the tooth guard due to her mouth falling open during her sleep.  She will doze off/nap several times during the day especially if she is reading her Bible.  Gordo score is 15/24.  She has had problems with excessive leg movement and pain in her legs nightly for years.  States she will use IcyHot on her legs prior to going to sleep.  She does also note of her legs excessively during the day for comfort.  She has tried gabapentin in the past but reports that did not make her feel well.  We will assess for sleep apnea first

## 2024-09-24 DIAGNOSIS — E03.9 ACQUIRED HYPOTHYROIDISM: ICD-10-CM

## 2024-09-24 RX ORDER — LEVOTHYROXINE SODIUM 200 UG/1
200 TABLET ORAL
Qty: 90 TABLET | Refills: 1 | Status: SHIPPED | OUTPATIENT
Start: 2024-09-24

## 2024-11-04 ENCOUNTER — HOSPITAL ENCOUNTER (OUTPATIENT)
Dept: SLEEP CENTER | Age: 64
Discharge: HOME | End: 2024-11-07

## 2024-11-04 PROCEDURE — 95806 SLEEP STUDY UNATT&RESP EFFT: CPT

## 2024-12-10 ENCOUNTER — TELEPHONE (OUTPATIENT)
Dept: INTERNAL MEDICINE CLINIC | Facility: CLINIC | Age: 64
End: 2024-12-10

## 2024-12-10 DIAGNOSIS — F32.A DEPRESSION, UNSPECIFIED DEPRESSION TYPE: ICD-10-CM

## 2024-12-10 NOTE — TELEPHONE ENCOUNTER
Patient called requesting a refill on hersertraline (ZOLOFT) 50 MG tablet. Please Advise.       [facility]

## 2024-12-18 ENCOUNTER — OFFICE VISIT (OUTPATIENT)
Dept: INTERNAL MEDICINE CLINIC | Facility: CLINIC | Age: 64
End: 2024-12-18

## 2024-12-18 ENCOUNTER — TELEPHONE (OUTPATIENT)
Dept: INTERNAL MEDICINE CLINIC | Facility: CLINIC | Age: 64
End: 2024-12-18

## 2024-12-18 VITALS
SYSTOLIC BLOOD PRESSURE: 118 MMHG | DIASTOLIC BLOOD PRESSURE: 80 MMHG | BODY MASS INDEX: 36.41 KG/M2 | TEMPERATURE: 97.6 F | HEART RATE: 70 BPM | HEIGHT: 67 IN | OXYGEN SATURATION: 97 % | WEIGHT: 232 LBS

## 2024-12-18 DIAGNOSIS — D47.2 GAMMOPATHY: ICD-10-CM

## 2024-12-18 DIAGNOSIS — F32.A DEPRESSION, UNSPECIFIED DEPRESSION TYPE: ICD-10-CM

## 2024-12-18 DIAGNOSIS — E03.9 ACQUIRED HYPOTHYROIDISM: ICD-10-CM

## 2024-12-18 DIAGNOSIS — E11.9 TYPE 2 DIABETES MELLITUS WITHOUT COMPLICATION, WITHOUT LONG-TERM CURRENT USE OF INSULIN (HCC): ICD-10-CM

## 2024-12-18 DIAGNOSIS — J40 BRONCHITIS, NOT SPECIFIED AS ACUTE OR CHRONIC: ICD-10-CM

## 2024-12-18 DIAGNOSIS — D47.2 MONOCLONAL GAMMOPATHY OF UNKNOWN SIGNIFICANCE: ICD-10-CM

## 2024-12-18 DIAGNOSIS — S49.91XA SHOULDER INJURY, RIGHT, INITIAL ENCOUNTER: Primary | ICD-10-CM

## 2024-12-18 LAB
ALBUMIN SERPL-MCNC: 3.7 G/DL (ref 3.2–4.6)
ALBUMIN/GLOB SERPL: 1.2 (ref 1–1.9)
ALP SERPL-CCNC: 97 U/L (ref 35–104)
ALT SERPL-CCNC: 26 U/L (ref 8–45)
ANION GAP SERPL CALC-SCNC: 9 MMOL/L (ref 7–16)
AST SERPL-CCNC: 21 U/L (ref 15–37)
BILIRUB SERPL-MCNC: 0.3 MG/DL (ref 0–1.2)
BUN SERPL-MCNC: 16 MG/DL (ref 8–23)
CALCIUM SERPL-MCNC: 9.4 MG/DL (ref 8.8–10.2)
CHLORIDE SERPL-SCNC: 99 MMOL/L (ref 98–107)
CHOLEST SERPL-MCNC: 211 MG/DL (ref 0–200)
CO2 SERPL-SCNC: 30 MMOL/L (ref 20–29)
CREAT SERPL-MCNC: 0.64 MG/DL (ref 0.6–1.1)
EST. AVERAGE GLUCOSE BLD GHB EST-MCNC: 150 MG/DL
GLOBULIN SER CALC-MCNC: 3 G/DL (ref 2.3–3.5)
GLUCOSE SERPL-MCNC: 124 MG/DL (ref 70–99)
HBA1C MFR BLD: 6.9 % (ref 0–5.6)
HDLC SERPL-MCNC: 69 MG/DL (ref 40–60)
HDLC SERPL: 3 (ref 0–5)
LDLC SERPL CALC-MCNC: 123 MG/DL (ref 0–100)
POTASSIUM SERPL-SCNC: 4.6 MMOL/L (ref 3.5–5.1)
PROT SERPL-MCNC: 6.7 G/DL (ref 6.3–8.2)
SODIUM SERPL-SCNC: 138 MMOL/L (ref 136–145)
TRIGL SERPL-MCNC: 94 MG/DL (ref 0–150)
TSH W FREE THYROID IF ABNORMAL: 1.14 UIU/ML (ref 0.27–4.2)
VLDLC SERPL CALC-MCNC: 19 MG/DL (ref 6–23)

## 2024-12-18 PROCEDURE — 99214 OFFICE O/P EST MOD 30 MIN: CPT | Performed by: INTERNAL MEDICINE

## 2024-12-18 PROCEDURE — 3044F HG A1C LEVEL LT 7.0%: CPT | Performed by: INTERNAL MEDICINE

## 2024-12-18 RX ORDER — LEVOTHYROXINE SODIUM 200 UG/1
200 TABLET ORAL
Qty: 90 TABLET | Refills: 3 | Status: SHIPPED | OUTPATIENT
Start: 2024-12-18

## 2024-12-18 RX ORDER — NYSTATIN 100000 U/G
CREAM TOPICAL 2 TIMES DAILY
Qty: 15 G | Refills: 5 | Status: SHIPPED | OUTPATIENT
Start: 2024-12-18

## 2024-12-18 RX ORDER — MONTELUKAST SODIUM 10 MG/1
10 TABLET ORAL NIGHTLY
Qty: 90 TABLET | Refills: 3 | Status: SHIPPED | OUTPATIENT
Start: 2024-12-18

## 2024-12-18 ASSESSMENT — ENCOUNTER SYMPTOMS
DIARRHEA: 0
CONSTIPATION: 0
NAUSEA: 0
WHEEZING: 0
SHORTNESS OF BREATH: 0
VOMITING: 0
ABDOMINAL PAIN: 0
SINUS PAIN: 0

## 2024-12-18 NOTE — TELEPHONE ENCOUNTER
Diana with [facility] called and states that the patients MRI order needs to be electronically signed or physically signed and STAT needs to be put on the order with a call report number for someone to call back with the report. Please Advise.

## 2024-12-18 NOTE — TELEPHONE ENCOUNTER
Called and talked with Jennifer per Dr Wick MRI doesn't need to be ordered as STAT. She will let Diana know.

## 2024-12-18 NOTE — PROGRESS NOTES
Thought content normal.            Annamaria was seen today for shoulder pain and other.    Diagnoses and all orders for this visit:    Shoulder injury, right, initial encounter  -     MRI SHOULDER RIGHT WO CONTRAST; Future  -     MRI SHOULDER RIGHT WO CONTRAST; Future    Depression, unspecified depression type  -     sertraline (ZOLOFT) 50 MG tablet; Take 1 tablet by mouth daily    Acquired hypothyroidism  -     levothyroxine (SYNTHROID) 200 MCG tablet; Take 1 tablet by mouth every morning (before breakfast)  -     Hemoglobin A1C; Future  -     Lipid Panel; Future  -     Comprehensive Metabolic Panel; Future  -     TSH reflex to FT4; Future  -     TSH reflex to FT4  -     Comprehensive Metabolic Panel  -     Lipid Panel  -     Hemoglobin A1C    Bronchitis, not specified as acute or chronic  -     montelukast (SINGULAIR) 10 MG tablet; Take 1 tablet by mouth nightly    Monoclonal gammopathy of unknown significance  -     nystatin (MYCOSTATIN) 260022 UNIT/GM cream; Apply topically 2 times daily  -     Cancel: Monoclonal Protein Study, Quant; Future  -     Cancel: Monoclonal Protein Study, Quant    Type 2 diabetes mellitus without complication, without long-term current use of insulin ([facility]ly [facility])  -     Hemoglobin A1C; Future  -     Lipid Panel; Future  -     Comprehensive Metabolic Panel; Future  -     TSH reflex to FT4; Future  -     TSH reflex to FT4  -     Comprehensive Metabolic Panel  -     Lipid Panel  -     Hemoglobin A1C    Gammopathy    Other orders  -     Monoclonal Protein Study, Quant                 Delvin Wick DO

## 2024-12-24 LAB — IMMUNOLOGIST REVIEW: NORMAL
